# Patient Record
Sex: FEMALE | Race: BLACK OR AFRICAN AMERICAN | NOT HISPANIC OR LATINO | Employment: FULL TIME | RURAL
[De-identification: names, ages, dates, MRNs, and addresses within clinical notes are randomized per-mention and may not be internally consistent; named-entity substitution may affect disease eponyms.]

---

## 2018-04-19 ENCOUNTER — HISTORICAL (OUTPATIENT)
Dept: ADMINISTRATIVE | Facility: HOSPITAL | Age: 58
End: 2018-04-19

## 2018-04-24 LAB
LAB AP GENERAL CAT - HISTORICAL: NORMAL
LAB AP INTERPRETATION/RESULT - HISTORICAL: NEGATIVE
LAB AP SPECIMEN ADEQUACY - HISTORICAL: NORMAL
LAB AP SPECIMEN SUBMITTED - HISTORICAL: NORMAL

## 2019-04-23 ENCOUNTER — HISTORICAL (OUTPATIENT)
Dept: ADMINISTRATIVE | Facility: HOSPITAL | Age: 59
End: 2019-04-23

## 2021-08-04 ENCOUNTER — OFFICE VISIT (OUTPATIENT)
Dept: OBSTETRICS AND GYNECOLOGY | Facility: CLINIC | Age: 61
End: 2021-08-04
Payer: COMMERCIAL

## 2021-08-04 VITALS
RESPIRATION RATE: 16 BRPM | HEART RATE: 73 BPM | TEMPERATURE: 98 F | HEIGHT: 65 IN | SYSTOLIC BLOOD PRESSURE: 153 MMHG | BODY MASS INDEX: 39.49 KG/M2 | DIASTOLIC BLOOD PRESSURE: 99 MMHG | WEIGHT: 237 LBS

## 2021-08-04 DIAGNOSIS — N95.2 VAGINITIS, ATROPHIC: ICD-10-CM

## 2021-08-04 DIAGNOSIS — I10 HYPERTENSION, UNSPECIFIED TYPE: ICD-10-CM

## 2021-08-04 DIAGNOSIS — E66.9 OBESITY, UNSPECIFIED CLASSIFICATION, UNSPECIFIED OBESITY TYPE, UNSPECIFIED WHETHER SERIOUS COMORBIDITY PRESENT: ICD-10-CM

## 2021-08-04 DIAGNOSIS — Z01.419 ENCOUNTER FOR ANNUAL ROUTINE GYNECOLOGICAL EXAMINATION: Primary | ICD-10-CM

## 2021-08-04 DIAGNOSIS — E55.9 VITAMIN D DEFICIENCY: ICD-10-CM

## 2021-08-04 LAB
25(OH)D3 SERPL-MCNC: 35 NG/ML
ALBUMIN SERPL BCP-MCNC: 4 G/DL (ref 3.5–5)
ALBUMIN/GLOB SERPL: 1 {RATIO}
ALP SERPL-CCNC: 56 U/L (ref 50–130)
ALT SERPL W P-5'-P-CCNC: 43 U/L (ref 13–56)
ANION GAP SERPL CALCULATED.3IONS-SCNC: 7 MMOL/L (ref 7–16)
AST SERPL W P-5'-P-CCNC: 33 U/L (ref 15–37)
BASOPHILS # BLD AUTO: 0.03 K/UL (ref 0–0.2)
BASOPHILS NFR BLD AUTO: 0.4 % (ref 0–1)
BILIRUB SERPL-MCNC: 0.2 MG/DL (ref 0–1.2)
BILIRUB UR QL STRIP: NEGATIVE
BUN SERPL-MCNC: 12 MG/DL (ref 7–18)
BUN/CREAT SERPL: 18 (ref 6–20)
CALCIUM SERPL-MCNC: 9.3 MG/DL (ref 8.5–10.1)
CHLORIDE SERPL-SCNC: 104 MMOL/L (ref 98–107)
CHOLEST SERPL-MCNC: 204 MG/DL (ref 0–200)
CHOLEST/HDLC SERPL: 3.8 {RATIO}
CLARITY UR: CLEAR
CO2 SERPL-SCNC: 32 MMOL/L (ref 21–32)
COLOR UR: YELLOW
CREAT SERPL-MCNC: 0.68 MG/DL (ref 0.55–1.02)
DIFFERENTIAL METHOD BLD: ABNORMAL
EOSINOPHIL # BLD AUTO: 0.21 K/UL (ref 0–0.5)
EOSINOPHIL NFR BLD AUTO: 2.5 % (ref 1–4)
ERYTHROCYTE [DISTWIDTH] IN BLOOD BY AUTOMATED COUNT: 16.1 % (ref 11.5–14.5)
EST. AVERAGE GLUCOSE BLD GHB EST-MCNC: 154 MG/DL
GLOBULIN SER-MCNC: 4.2 G/DL (ref 2–4)
GLUCOSE SERPL-MCNC: 72 MG/DL (ref 74–106)
GLUCOSE UR STRIP-MCNC: NEGATIVE MG/DL
HBA1C MFR BLD HPLC: 7.2 % (ref 4.5–6.6)
HCT VFR BLD AUTO: 37.5 % (ref 38–47)
HDLC SERPL-MCNC: 54 MG/DL (ref 40–60)
HGB BLD-MCNC: 11.3 G/DL (ref 12–16)
IMM GRANULOCYTES # BLD AUTO: 0.06 K/UL (ref 0–0.04)
IMM GRANULOCYTES NFR BLD: 0.7 % (ref 0–0.4)
KETONES UR STRIP-SCNC: NEGATIVE MG/DL
LDLC SERPL CALC-MCNC: 123 MG/DL
LDLC/HDLC SERPL: 2.3 {RATIO}
LEUKOCYTE ESTERASE UR QL STRIP: NEGATIVE
LYMPHOCYTES # BLD AUTO: 2.47 K/UL (ref 1–4.8)
LYMPHOCYTES NFR BLD AUTO: 29.2 % (ref 27–41)
MCH RBC QN AUTO: 23.6 PG (ref 27–31)
MCHC RBC AUTO-ENTMCNC: 30.1 G/DL (ref 32–36)
MCV RBC AUTO: 78.5 FL (ref 80–96)
MONOCYTES # BLD AUTO: 0.61 K/UL (ref 0–0.8)
MONOCYTES NFR BLD AUTO: 7.2 % (ref 2–6)
MPC BLD CALC-MCNC: 9.6 FL (ref 9.4–12.4)
NEUTROPHILS # BLD AUTO: 5.08 K/UL (ref 1.8–7.7)
NEUTROPHILS NFR BLD AUTO: 60 % (ref 53–65)
NITRITE UR QL STRIP: NEGATIVE
NONHDLC SERPL-MCNC: 150 MG/DL
NRBC # BLD AUTO: 0 X10E3/UL
NRBC, AUTO (.00): 0 %
PH UR STRIP: 6.5 PH UNITS
PLATELET # BLD AUTO: 333 K/UL (ref 150–400)
POTASSIUM SERPL-SCNC: 3.5 MMOL/L (ref 3.5–5.1)
PROT SERPL-MCNC: 8.2 G/DL (ref 6.4–8.2)
PROT UR QL STRIP: NEGATIVE
RBC # BLD AUTO: 4.78 M/UL (ref 4.2–5.4)
RBC # UR STRIP: NEGATIVE /UL
SODIUM SERPL-SCNC: 139 MMOL/L (ref 136–145)
SP GR UR STRIP: <=1.005
TRIGL SERPL-MCNC: 133 MG/DL (ref 35–150)
UROBILINOGEN UR STRIP-ACNC: 0.2 MG/DL
VLDLC SERPL-MCNC: 27 MG/DL
WBC # BLD AUTO: 8.46 K/UL (ref 4.5–11)

## 2021-08-04 PROCEDURE — 85025 COMPLETE CBC W/AUTO DIFF WBC: CPT | Mod: ,,, | Performed by: CLINICAL MEDICAL LABORATORY

## 2021-08-04 PROCEDURE — 99396 PR PREVENTIVE VISIT,EST,40-64: ICD-10-PCS | Mod: ,,, | Performed by: OBSTETRICS & GYNECOLOGY

## 2021-08-04 PROCEDURE — 81003 URINALYSIS AUTO W/O SCOPE: CPT | Mod: QW,,, | Performed by: CLINICAL MEDICAL LABORATORY

## 2021-08-04 PROCEDURE — 83036 HEMOGLOBIN GLYCOSYLATED A1C: CPT | Mod: ,,, | Performed by: CLINICAL MEDICAL LABORATORY

## 2021-08-04 PROCEDURE — 80053 COMPREHENSIVE METABOLIC PANEL: ICD-10-PCS | Mod: ,,, | Performed by: CLINICAL MEDICAL LABORATORY

## 2021-08-04 PROCEDURE — 99396 PREV VISIT EST AGE 40-64: CPT | Mod: ,,, | Performed by: OBSTETRICS & GYNECOLOGY

## 2021-08-04 PROCEDURE — 36415 PR COLLECTION VENOUS BLOOD,VENIPUNCTURE: ICD-10-PCS | Mod: ,,, | Performed by: OBSTETRICS & GYNECOLOGY

## 2021-08-04 PROCEDURE — 36415 COLL VENOUS BLD VENIPUNCTURE: CPT | Mod: ,,, | Performed by: OBSTETRICS & GYNECOLOGY

## 2021-08-04 PROCEDURE — 80061 LIPID PANEL: ICD-10-PCS | Mod: ,,, | Performed by: CLINICAL MEDICAL LABORATORY

## 2021-08-04 PROCEDURE — 83036 HEMOGLOBIN A1C: ICD-10-PCS | Mod: ,,, | Performed by: CLINICAL MEDICAL LABORATORY

## 2021-08-04 PROCEDURE — 85025 CBC WITH DIFFERENTIAL: ICD-10-PCS | Mod: ,,, | Performed by: CLINICAL MEDICAL LABORATORY

## 2021-08-04 PROCEDURE — 80053 COMPREHEN METABOLIC PANEL: CPT | Mod: ,,, | Performed by: CLINICAL MEDICAL LABORATORY

## 2021-08-04 PROCEDURE — 80061 LIPID PANEL: CPT | Mod: ,,, | Performed by: CLINICAL MEDICAL LABORATORY

## 2021-08-04 PROCEDURE — 82306 VITAMIN D 25 HYDROXY: CPT | Mod: ,,, | Performed by: CLINICAL MEDICAL LABORATORY

## 2021-08-04 PROCEDURE — 81003 URINALYSIS: ICD-10-PCS | Mod: QW,,, | Performed by: CLINICAL MEDICAL LABORATORY

## 2021-08-04 PROCEDURE — 82306 VITAMIN D: ICD-10-PCS | Mod: ,,, | Performed by: CLINICAL MEDICAL LABORATORY

## 2021-08-04 PROCEDURE — 82270 PR BLOOD OCCULT, BY PEROX, FECES, SINGLE, COLORECT SCRN: ICD-10-PCS | Mod: ,,, | Performed by: OBSTETRICS & GYNECOLOGY

## 2021-08-04 PROCEDURE — 88142 CYTOPATH C/V THIN LAYER: CPT | Mod: GCY | Performed by: OBSTETRICS & GYNECOLOGY

## 2021-08-04 PROCEDURE — 82270 OCCULT BLOOD FECES: CPT | Mod: ,,, | Performed by: OBSTETRICS & GYNECOLOGY

## 2021-08-04 RX ORDER — LOSARTAN POTASSIUM AND HYDROCHLOROTHIAZIDE 12.5; 5 MG/1; MG/1
1 TABLET ORAL DAILY
COMMUNITY
Start: 2021-07-02

## 2021-08-04 RX ORDER — ASPIRIN 81 MG/1
81 TABLET ORAL DAILY
COMMUNITY

## 2021-08-04 RX ORDER — METHYLPREDNISOLONE 4 MG/1
TABLET ORAL
COMMUNITY
Start: 2021-06-16 | End: 2021-08-04

## 2021-08-04 RX ORDER — LINAGLIPTIN AND METFORMIN HYDROCHLORIDE 2.5; 5 MG/1; MG/1
1 TABLET, FILM COATED ORAL 2 TIMES DAILY WITH MEALS
COMMUNITY
Start: 2021-06-28

## 2021-08-06 LAB
GH SERPL-MCNC: NORMAL NG/ML
INSULIN SERPL-ACNC: NORMAL U[IU]/ML
LAB AP CLINICAL INFORMATION: NORMAL
LAB AP GYN INTERPRETATION: NEGATIVE
LAB AP PAP DISCLAIMER COMMENTS: NORMAL
RENIN PLAS-CCNC: NORMAL NG/ML/H

## 2023-07-06 ENCOUNTER — OFFICE VISIT (OUTPATIENT)
Dept: OBSTETRICS AND GYNECOLOGY | Facility: CLINIC | Age: 63
End: 2023-07-06
Payer: COMMERCIAL

## 2023-07-06 VITALS
HEART RATE: 94 BPM | RESPIRATION RATE: 16 BRPM | BODY MASS INDEX: 39.22 KG/M2 | SYSTOLIC BLOOD PRESSURE: 144 MMHG | TEMPERATURE: 98 F | WEIGHT: 235.38 LBS | DIASTOLIC BLOOD PRESSURE: 88 MMHG | HEIGHT: 65 IN

## 2023-07-06 DIAGNOSIS — E11.9 DIABETES MELLITUS TYPE II, NON INSULIN DEPENDENT: ICD-10-CM

## 2023-07-06 DIAGNOSIS — L73.9 FOLLICULITIS: Primary | ICD-10-CM

## 2023-07-06 DIAGNOSIS — E55.9 VITAMIN D DEFICIENCY: ICD-10-CM

## 2023-07-06 DIAGNOSIS — E66.9 OBESITY WITH BODY MASS INDEX OF 30.0-39.9: ICD-10-CM

## 2023-07-06 DIAGNOSIS — N95.2 VAGINITIS, ATROPHIC: ICD-10-CM

## 2023-07-06 DIAGNOSIS — Z01.419 ENCOUNTER FOR ANNUAL ROUTINE GYNECOLOGICAL EXAMINATION: ICD-10-CM

## 2023-07-06 DIAGNOSIS — I10 HYPERTENSION, UNSPECIFIED TYPE: ICD-10-CM

## 2023-07-06 DIAGNOSIS — R87.610 ATYPICAL SQUAMOUS CELLS OF UNDETERMINED SIGNIFICANCE (ASCUS) ON PAPANICOLAOU SMEAR OF CERVIX: ICD-10-CM

## 2023-07-06 LAB
BILIRUB UR QL STRIP: NEGATIVE
CLARITY UR: CLEAR
COLOR UR: COLORLESS
GLUCOSE UR STRIP-MCNC: NORMAL MG/DL
KETONES UR STRIP-SCNC: NEGATIVE MG/DL
LEUKOCYTE ESTERASE UR QL STRIP: NEGATIVE
MUCOUS, UA: ABNORMAL /LPF
NITRITE UR QL STRIP: NEGATIVE
PH UR STRIP: 6.5 PH UNITS
PROT UR QL STRIP: NEGATIVE
RBC # UR STRIP: NEGATIVE /UL
SP GR UR STRIP: 1.01
SQUAMOUS #/AREA URNS LPF: ABNORMAL /HPF
UROBILINOGEN UR STRIP-ACNC: NORMAL MG/DL
WBC #/AREA URNS HPF: 1 /HPF

## 2023-07-06 PROCEDURE — 87624 HUMAN PAPILLOMAVIRUS (HPV): ICD-10-PCS | Mod: ,,, | Performed by: CLINICAL MEDICAL LABORATORY

## 2023-07-06 PROCEDURE — 99396 PR PREVENTIVE VISIT,EST,40-64: ICD-10-PCS | Mod: S$PBB,,, | Performed by: OBSTETRICS & GYNECOLOGY

## 2023-07-06 PROCEDURE — 88142 CYTOPATH C/V THIN LAYER: CPT | Mod: TC,GCY | Performed by: OBSTETRICS & GYNECOLOGY

## 2023-07-06 PROCEDURE — 81001 URINALYSIS AUTO W/SCOPE: CPT | Mod: ,,, | Performed by: CLINICAL MEDICAL LABORATORY

## 2023-07-06 PROCEDURE — 3079F PR MOST RECENT DIASTOLIC BLOOD PRESSURE 80-89 MM HG: ICD-10-PCS | Mod: ,,, | Performed by: OBSTETRICS & GYNECOLOGY

## 2023-07-06 PROCEDURE — 3008F BODY MASS INDEX DOCD: CPT | Mod: ,,, | Performed by: OBSTETRICS & GYNECOLOGY

## 2023-07-06 PROCEDURE — 1160F PR REVIEW ALL MEDS BY PRESCRIBER/CLIN PHARMACIST DOCUMENTED: ICD-10-PCS | Mod: ,,, | Performed by: OBSTETRICS & GYNECOLOGY

## 2023-07-06 PROCEDURE — 99214 OFFICE O/P EST MOD 30 MIN: CPT | Mod: PBBFAC | Performed by: OBSTETRICS & GYNECOLOGY

## 2023-07-06 PROCEDURE — 99396 PREV VISIT EST AGE 40-64: CPT | Mod: S$PBB,,, | Performed by: OBSTETRICS & GYNECOLOGY

## 2023-07-06 PROCEDURE — 88141 THINPREP PAP TEST: ICD-10-PCS | Mod: ,,, | Performed by: PATHOLOGY

## 2023-07-06 PROCEDURE — 3079F DIAST BP 80-89 MM HG: CPT | Mod: ,,, | Performed by: OBSTETRICS & GYNECOLOGY

## 2023-07-06 PROCEDURE — 81001 URINALYSIS: ICD-10-PCS | Mod: ,,, | Performed by: CLINICAL MEDICAL LABORATORY

## 2023-07-06 PROCEDURE — 3008F PR BODY MASS INDEX (BMI) DOCUMENTED: ICD-10-PCS | Mod: ,,, | Performed by: OBSTETRICS & GYNECOLOGY

## 2023-07-06 PROCEDURE — 87624 HPV HI-RISK TYP POOLED RSLT: CPT | Mod: ,,, | Performed by: CLINICAL MEDICAL LABORATORY

## 2023-07-06 PROCEDURE — 1159F MED LIST DOCD IN RCRD: CPT | Mod: ,,, | Performed by: OBSTETRICS & GYNECOLOGY

## 2023-07-06 PROCEDURE — 88141 CYTOPATH C/V INTERPRET: CPT | Mod: ,,, | Performed by: PATHOLOGY

## 2023-07-06 PROCEDURE — 1159F PR MEDICATION LIST DOCUMENTED IN MEDICAL RECORD: ICD-10-PCS | Mod: ,,, | Performed by: OBSTETRICS & GYNECOLOGY

## 2023-07-06 PROCEDURE — 3077F SYST BP >= 140 MM HG: CPT | Mod: ,,, | Performed by: OBSTETRICS & GYNECOLOGY

## 2023-07-06 PROCEDURE — 82270 OCCULT BLOOD FECES: CPT | Mod: PBBFAC | Performed by: OBSTETRICS & GYNECOLOGY

## 2023-07-06 PROCEDURE — 3077F PR MOST RECENT SYSTOLIC BLOOD PRESSURE >= 140 MM HG: ICD-10-PCS | Mod: ,,, | Performed by: OBSTETRICS & GYNECOLOGY

## 2023-07-06 PROCEDURE — 1160F RVW MEDS BY RX/DR IN RCRD: CPT | Mod: ,,, | Performed by: OBSTETRICS & GYNECOLOGY

## 2023-07-06 RX ORDER — SITAGLIPTIN AND METFORMIN HYDROCHLORIDE 1000; 100 MG/1; MG/1
TABLET, FILM COATED, EXTENDED RELEASE ORAL
COMMUNITY
Start: 2023-07-03

## 2023-07-06 RX ORDER — HYDROXYCHLOROQUINE SULFATE 200 MG/1
TABLET, FILM COATED ORAL
COMMUNITY
Start: 2023-01-16

## 2023-07-06 NOTE — PROGRESS NOTES
Iza Cortez female  for   Chief Complaint   Patient presents with    Gynecologic Exam     With pap    Bumps on the private area according to the patient for the       OB History          3    Para   3    Term                AB        Living   3         SAB        IAB        Ectopic        Multiple        Live Births                      HPI:  Patient presents today with complaint of least 1 month of tiny bumps on the vulva.  Patient is a  3, para 3. Patient does present for a general gynecologic exam as well.  Patient has had a tubal sterilization in the past.  The patient is post menopause with no history of vaginal bleeding since menopause.  She is on no hormone replacement therapy.  Patient is sexually active without any painful intercourse.    She denies any overactive bladder issues.  She has no urinary stress incontinence.  She has no history of urinary tract infections.  She denies any hematuria, no vaginal bleeding or vaginal discharge and she denies any rectal bleeding.    Past Medical History:   Diagnosis Date    Amenorrhea 2011    Amenorrhea, secondary 2011    Anemia 2006    mild; chronic 10/22/2012-Hgb L 10.4 g/dL 11.0 to 16.0 Hct L 32.5% 2015- No change in [facility]    Anemia 2006    mild; chronic 10/22/2012 - Hgb L 10.4 g/dl; 11.0 to 16.0;  Hct 32.5%;  2015 - no change    BV (bacterial vaginosis) 2011    Constipation 2015    chronic condition    Constipation, unspecified 2015    This is a chronic condition    Diabetes     Diabetes mellitus type II, non insulin dependent     history of     DUB (dysfunctional uterine bleeding) 2006    Dysfunctional uterine bleeding 2006    HTN (hypertension) 2011    Hypertension 2011    Leiomyoma, intramural 2006    cf the report 4 cm fibroid    Leiomyoma, intramural 2006    cf the report  4 cm fibroid    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     Physical exam:    BP (!) 144/88   Pulse 94   Temp 98.4 °F (36.9 °C)   Resp 16   Ht 5' 5" (1.651 m)   Wt 106.8 kg (235 lb 6.4 oz)   BMI 39.17 kg/m²      General Appearance: healthy, alert, no distress, smiling, moderately obese    HEENT: Normal exam    Lymphatic: no palpable lymphadenopathy, no hepatosplenomegaly    Chest:           Breasts:No dimpling, nipple retraction or discharge. No masses or nodes., Normal to inspection, and Normal breast tissue bilaterally           Lungs:clear to auscultation bilaterally, normal percussion bilaterally           Heart: regular rate and rhythm, S1, S2 normal, no murmur, click, rub or gallop    Abdomen: soft, non-tender, without masses or organomegaly, normal bowel sounds, without guarding, without rebound, and moderate abdominal panniculus with no evidence of abdominal bruit or evidence of abnormal fluid collection i.e. no ascites.  There is no ventral or incisional hernia.    Pelvic:                    Vulva: normal appearing vulva with no masses, tenderness or lesions, inspection of the entire vulva reveals no evidence of sebaceous glandular disorder; there is an area just above the labia majora on the left that may have then a folliculitis that is now resolved.  There is no inguinal lymphadenopathy.  There is no signs of vulvar carcinoma or neoplasia.  There is no erythema of the vulva.                     Cervix: normal appearing cervix without discharge " or lesions, multiparous os                    Uterus: uterus is normal size, shape, consistency and nontender, anteverted, mobile                    Annexa(e):  And nontender                   Rectal: normal rectal, no masses, guaiac negative stool obtained.     Extremity: normal, extremities warm, no clubbing, no cyanosis, slight lower extremity edema bilaterally, non-tender; no CVA tenderness bilaterally; negative Homans sign bilaterally    Skin: normal exam    Psych and neurologic exam: WNL                Assessment:   Problem List Items Addressed This Visit          Cardiac/Vascular    Hypertension       Endocrine    Diabetes mellitus type II, non insulin dependent    Relevant Medications    JANUMET -1,000 mg TM24    Obesity with body mass index of 30.0-39.9     Other Visit Diagnoses       Folliculitis    -  Primary    Encounter for annual routine gynecological examination        Vaginitis, atrophic        Vitamin D deficiency                 Plan:  Screening labs; thin prep Pap; negative today; vitamin-D check; recommend keeping her appointment in August of 2023 for a annual mammogram and recommend monthly breast self-exam; recommend interval colonoscopy             Patient was reassured there is no active vulvar disorder such as active folliculitis infection.  Dr. Luan Santos does recommend the patient avoid synthetic undergarments and she was instructed to wear very loose cotton undergarments and avoid during hot weather especially pants or troubles years but wear loose-fitting dresses etc. this will allow for vulvar ventilation.  She was advised to avoid staying in a moist environment such as wet swim suits.  She was advised to use Dial soap for antibacterial persists around the pubic hair.  Patient was reassured.             Patient was advised continue on her vitamin-D therapy     0 Result Notes    1  Topic       Component Ref Range & Units    Case Report   Pap Cytology                                       Case: [id number]                                    Authorizing Provider:  Luan Santos MD         Collected:           07/06/2023 04:37 PM           Ordering Location:     [facility] Received:            07/07/2023 08:40 AM                                  - Obstetrics And                                                                                     Gynecology                                                                    First Screen:          GHULAM Rogel(ASCP)                                                     Rescreen:              GHULAM Lynn(ASCP)                                                         Pathologist:           Minh Jean-Baptiste III, MD                                                                            Specimen:    Liquid-Based Pap Test, Screening, Cervix                                                   Interpretation   Atypical Squamous Cells of Undetermined Significance Abnormal       General Categorization   Epithelial Cell Abnormality   Electronically signed         Addendum on 07/19/2023:  Patient has ASCUS which is a new finding but her HPV is negative.  Patient does need a screening colposcopy.

## 2023-07-06 NOTE — PATIENT INSTRUCTIONS
Dr. Luan Santos thanks you for this office visit at [facility].    Diagnosis for this visit:   Problem List Items Addressed This Visit          Cardiac/Vascular    Hypertension       Endocrine    Diabetes mellitus type II, non insulin dependent    Relevant Medications    JANUMET -1,000 mg TM24    Obesity with body mass index of 30.0-39.9     Other Visit Diagnoses       Folliculitis    -  Primary    Encounter for annual routine gynecological examination        Vaginitis, atrophic        Vitamin D deficiency                 The Plan: Screening labs; thin prep Pap; negative today; vitamin-D check; recommend keeping her appointment in August of 2023 for a annual mammogram and recommend monthly breast self-exam; recommend interval colonoscopy             Patient was reassured there is no active vulvar disorder such as active folliculitis infection.  Dr. Luan Santos does recommend the patient avoid synthetic undergarments and she was instructed to wear very loose cotton undergarments and avoid during hot weather especially pants or troubles years but wear loose-fitting dresses etc. this will allow for vulvar ventilation.  She was advised to avoid staying in a moist environment such as wet swim suits.  She was advised to use Dial soap for antibacterial persists around the pubic hair.  Patient was reassured.             Patient was advised continue on her vitamin-D therapy which is believed        Please practice best food and exercise habits for your age.    Dr. Luan Santos recommends avoidance of smoking and illicit medications or habits.    Please call  or schedule for any change in your health.     Please keep the next scheduled appointment or call for any need to change the appointment.     Dr. Luan Santos recommends yearly exams for good health maintenance.      Thank you    Dr. Luan Santos  07/06/2023 4:29 PM

## 2023-07-10 LAB
GH SERPL-MCNC: ABNORMAL NG/ML
INSULIN SERPL-ACNC: ABNORMAL U[IU]/ML
LAB AP CLINICAL INFORMATION: ABNORMAL
LAB AP GYN INTERPRETATION: ABNORMAL
LAB AP PAP DISCLAIMER COMMENTS: ABNORMAL
RENIN PLAS-CCNC: ABNORMAL NG/ML/H

## 2023-07-14 LAB
HPV 16: NEGATIVE
HPV 18: NEGATIVE
HPV OTHER: NEGATIVE

## 2024-09-24 ENCOUNTER — OFFICE VISIT (OUTPATIENT)
Dept: OBSTETRICS AND GYNECOLOGY | Facility: CLINIC | Age: 64
End: 2024-09-24
Payer: COMMERCIAL

## 2024-09-24 ENCOUNTER — HOSPITAL ENCOUNTER (EMERGENCY)
Facility: HOSPITAL | Age: 64
Discharge: HOME | End: 2024-09-25
Attending: EMERGENCY MEDICINE
Payer: COMMERCIAL

## 2024-09-24 VITALS
SYSTOLIC BLOOD PRESSURE: 130 MMHG | WEIGHT: 225 LBS | DIASTOLIC BLOOD PRESSURE: 75 MMHG | OXYGEN SATURATION: 99 % | HEART RATE: 110 BPM | HEIGHT: 65 IN | RESPIRATION RATE: 18 BRPM | BODY MASS INDEX: 37.49 KG/M2

## 2024-09-24 DIAGNOSIS — N25.9 IMPAIRED RENAL FUNCTION DISORDER: ICD-10-CM

## 2024-09-24 DIAGNOSIS — E83.52 HYPERCALCEMIA: ICD-10-CM

## 2024-09-24 DIAGNOSIS — N95.2 VAGINITIS, ATROPHIC: ICD-10-CM

## 2024-09-24 DIAGNOSIS — Z78.0 POST-MENOPAUSAL: ICD-10-CM

## 2024-09-24 DIAGNOSIS — Z01.89 ENCOUNTER FOR BONE DENSITY MEASUREMENT FOR THERAPEUTIC DRUG MONITORING: Primary | ICD-10-CM

## 2024-09-24 DIAGNOSIS — Z01.419 ENCOUNTER FOR ANNUAL ROUTINE GYNECOLOGICAL EXAMINATION: ICD-10-CM

## 2024-09-24 DIAGNOSIS — Z79.899 ENCOUNTER FOR BONE DENSITY MEASUREMENT FOR THERAPEUTIC DRUG MONITORING: Primary | ICD-10-CM

## 2024-09-24 DIAGNOSIS — I10 HYPERTENSION, UNSPECIFIED TYPE: ICD-10-CM

## 2024-09-24 DIAGNOSIS — E66.9 OBESITY WITH BODY MASS INDEX OF 30.0-39.9: ICD-10-CM

## 2024-09-24 DIAGNOSIS — R73.9 HYPERGLYCEMIA: Primary | ICD-10-CM

## 2024-09-24 DIAGNOSIS — E11.65 HYPERGLYCEMIA DUE TO DIABETES MELLITUS: ICD-10-CM

## 2024-09-24 DIAGNOSIS — K59.00 CONSTIPATION, UNSPECIFIED CONSTIPATION TYPE: ICD-10-CM

## 2024-09-24 DIAGNOSIS — E11.9 DIABETES MELLITUS TYPE II, NON INSULIN DEPENDENT: ICD-10-CM

## 2024-09-24 LAB
ALBUMIN SERPL BCP-MCNC: 3.7 G/DL (ref 3.5–5)
ALBUMIN/GLOB SERPL: 1 {RATIO}
ALP SERPL-CCNC: 77 U/L (ref 50–130)
ALT SERPL W P-5'-P-CCNC: 46 U/L (ref 13–56)
ANION GAP SERPL CALCULATED.3IONS-SCNC: 9 MMOL/L (ref 7–16)
AST SERPL W P-5'-P-CCNC: 33 U/L (ref 15–37)
BASOPHILS # BLD AUTO: 0.03 K/UL (ref 0–0.2)
BASOPHILS NFR BLD AUTO: 0.4 % (ref 0–1)
BILIRUB SERPL-MCNC: 0.2 MG/DL (ref ?–1.2)
BILIRUB UR QL STRIP: NEGATIVE
BILIRUB UR QL STRIP: NEGATIVE
BUN SERPL-MCNC: 18 MG/DL (ref 7–18)
BUN/CREAT SERPL: 14 (ref 6–20)
CALCIUM SERPL-MCNC: 9.4 MG/DL (ref 8.5–10.1)
CHLORIDE SERPL-SCNC: 99 MMOL/L (ref 98–107)
CLARITY UR: CLEAR
CLARITY UR: CLEAR
CO2 SERPL-SCNC: 27 MMOL/L (ref 21–32)
COLOR UR: COLORLESS
COLOR UR: COLORLESS
CREAT SERPL-MCNC: 1.27 MG/DL (ref 0.55–1.02)
DIFFERENTIAL METHOD BLD: ABNORMAL
EGFR (NO RACE VARIABLE) (RUSH/TITUS): 47 ML/MIN/1.73M2
EOSINOPHIL # BLD AUTO: 0.19 K/UL (ref 0–0.5)
EOSINOPHIL NFR BLD AUTO: 2.7 % (ref 1–4)
ERYTHROCYTE [DISTWIDTH] IN BLOOD BY AUTOMATED COUNT: 15.5 % (ref 11.5–14.5)
GLOBULIN SER-MCNC: 3.8 G/DL (ref 2–4)
GLUCOSE SERPL-MCNC: 407 MG/DL (ref 70–105)
GLUCOSE SERPL-MCNC: 606 MG/DL (ref 74–106)
GLUCOSE UR STRIP-MCNC: >1000 MG/DL
GLUCOSE UR STRIP-MCNC: ABNORMAL MG/DL
HCO3 UR-SCNC: 29.7 MMOL/L (ref 24–28)
HCT VFR BLD AUTO: 36 % (ref 38–47)
HCT VFR BLD CALC: 41 % (ref 35–51)
HGB BLD-MCNC: 11 G/DL (ref 12–16)
IMM GRANULOCYTES # BLD AUTO: 0.04 K/UL (ref 0–0.04)
IMM GRANULOCYTES NFR BLD: 0.6 % (ref 0–0.4)
KETONES UR STRIP-SCNC: ABNORMAL MG/DL
KETONES UR STRIP-SCNC: NEGATIVE MG/DL
LDH SERPL L TO P-CCNC: 1.8 MMOL/L (ref 0.3–1.2)
LEUKOCYTE ESTERASE UR QL STRIP: NEGATIVE
LEUKOCYTE ESTERASE UR QL STRIP: NEGATIVE
LYMPHOCYTES # BLD AUTO: 2.32 K/UL (ref 1–4.8)
LYMPHOCYTES NFR BLD AUTO: 32.6 % (ref 27–41)
MCH RBC QN AUTO: 23.6 PG (ref 27–31)
MCHC RBC AUTO-ENTMCNC: 30.6 G/DL (ref 32–36)
MCV RBC AUTO: 77.1 FL (ref 80–96)
MONOCYTES # BLD AUTO: 0.53 K/UL (ref 0–0.8)
MONOCYTES NFR BLD AUTO: 7.5 % (ref 2–6)
MPC BLD CALC-MCNC: 9.9 FL (ref 9.4–12.4)
NEUTROPHILS # BLD AUTO: 4 K/UL (ref 1.8–7.7)
NEUTROPHILS NFR BLD AUTO: 56.2 % (ref 53–65)
NITRITE UR QL STRIP: NEGATIVE
NITRITE UR QL STRIP: NEGATIVE
NRBC # BLD AUTO: 0 X10E3/UL
NRBC, AUTO (.00): 0 %
PCO2 BLDA: 48 MMHG (ref 41–51)
PH SMN: 7.4 [PH] (ref 7.32–7.42)
PH UR STRIP: 5.5 PH UNITS
PH UR STRIP: 5.5 PH UNITS
PLATELET # BLD AUTO: 290 K/UL (ref 150–400)
PO2 BLDA: 24 MMHG (ref 25–40)
POC BASE EXCESS: 4 MMOL/L (ref -2–3)
POC CO2: 31.2 MMOL/L
POC IONIZED CALCIUM: 1.17 MMOL/L (ref 1.15–1.35)
POC SATURATED O2: 43 % (ref 40–70)
POCT GLUCOSE: >500 MG/DL (ref 60–95)
POTASSIUM BLD-SCNC: 4.5 MMOL/L (ref 3.4–4.5)
POTASSIUM SERPL-SCNC: 4.4 MMOL/L (ref 3.5–5.1)
PROT SERPL-MCNC: 7.5 G/DL (ref 6.4–8.2)
PROT UR QL STRIP: NEGATIVE
PROT UR QL STRIP: NEGATIVE
RBC # BLD AUTO: 4.67 M/UL (ref 4.2–5.4)
RBC # UR STRIP: NEGATIVE /UL
RBC # UR STRIP: NEGATIVE /UL
RBC #/AREA URNS HPF: <1 /HPF
SODIUM BLD-SCNC: 130 MMOL/L (ref 136–145)
SODIUM SERPL-SCNC: 131 MMOL/L (ref 136–145)
SP GR UR STRIP: 1.02
SP GR UR STRIP: 1.03
SQUAMOUS #/AREA URNS LPF: ABNORMAL /HPF
UROBILINOGEN UR STRIP-ACNC: NORMAL MG/DL
UROBILINOGEN UR STRIP-ACNC: NORMAL MG/DL
WBC # BLD AUTO: 7.11 K/UL (ref 4.5–11)
WBC #/AREA URNS HPF: <1 /HPF

## 2024-09-24 PROCEDURE — 88142 CYTOPATH C/V THIN LAYER: CPT | Mod: TC,GCY | Performed by: OBSTETRICS & GYNECOLOGY

## 2024-09-24 PROCEDURE — 83605 ASSAY OF LACTIC ACID: CPT

## 2024-09-24 PROCEDURE — 85014 HEMATOCRIT: CPT

## 2024-09-24 PROCEDURE — 3078F DIAST BP <80 MM HG: CPT | Mod: ,,, | Performed by: OBSTETRICS & GYNECOLOGY

## 2024-09-24 PROCEDURE — 3075F SYST BP GE 130 - 139MM HG: CPT | Mod: ,,, | Performed by: OBSTETRICS & GYNECOLOGY

## 2024-09-24 PROCEDURE — 82803 BLOOD GASES ANY COMBINATION: CPT

## 2024-09-24 PROCEDURE — 82962 GLUCOSE BLOOD TEST: CPT

## 2024-09-24 PROCEDURE — 84295 ASSAY OF SERUM SODIUM: CPT

## 2024-09-24 PROCEDURE — 99284 EMERGENCY DEPT VISIT MOD MDM: CPT | Mod: 25

## 2024-09-24 PROCEDURE — 99396 PREV VISIT EST AGE 40-64: CPT | Mod: S$PBB,,, | Performed by: OBSTETRICS & GYNECOLOGY

## 2024-09-24 PROCEDURE — 81001 URINALYSIS AUTO W/SCOPE: CPT | Mod: ,,, | Performed by: CLINICAL MEDICAL LABORATORY

## 2024-09-24 PROCEDURE — 84132 ASSAY OF SERUM POTASSIUM: CPT

## 2024-09-24 PROCEDURE — 88141 CYTOPATH C/V INTERPRET: CPT | Mod: ,,, | Performed by: PATHOLOGY

## 2024-09-24 PROCEDURE — 99999PBSHW PR PBB SHADOW TECHNICAL ONLY FILED TO HB: Mod: PBBFAC,,,

## 2024-09-24 PROCEDURE — 1160F RVW MEDS BY RX/DR IN RCRD: CPT | Mod: ,,, | Performed by: OBSTETRICS & GYNECOLOGY

## 2024-09-24 PROCEDURE — 85025 COMPLETE CBC W/AUTO DIFF WBC: CPT | Performed by: EMERGENCY MEDICINE

## 2024-09-24 PROCEDURE — 96374 THER/PROPH/DIAG INJ IV PUSH: CPT

## 2024-09-24 PROCEDURE — 82330 ASSAY OF CALCIUM: CPT

## 2024-09-24 PROCEDURE — 96361 HYDRATE IV INFUSION ADD-ON: CPT

## 2024-09-24 PROCEDURE — 63600175 PHARM REV CODE 636 W HCPCS: Performed by: EMERGENCY MEDICINE

## 2024-09-24 PROCEDURE — 82270 OCCULT BLOOD FECES: CPT | Mod: PBBFAC | Performed by: OBSTETRICS & GYNECOLOGY

## 2024-09-24 PROCEDURE — 3008F BODY MASS INDEX DOCD: CPT | Mod: ,,, | Performed by: OBSTETRICS & GYNECOLOGY

## 2024-09-24 PROCEDURE — 80053 COMPREHEN METABOLIC PANEL: CPT | Performed by: EMERGENCY MEDICINE

## 2024-09-24 PROCEDURE — 1159F MED LIST DOCD IN RCRD: CPT | Mod: ,,, | Performed by: OBSTETRICS & GYNECOLOGY

## 2024-09-24 PROCEDURE — 87624 HPV HI-RISK TYP POOLED RSLT: CPT | Mod: ,,, | Performed by: CLINICAL MEDICAL LABORATORY

## 2024-09-24 PROCEDURE — 82947 ASSAY GLUCOSE BLOOD QUANT: CPT

## 2024-09-24 PROCEDURE — 99214 OFFICE O/P EST MOD 30 MIN: CPT | Mod: PBBFAC | Performed by: OBSTETRICS & GYNECOLOGY

## 2024-09-24 PROCEDURE — 99999 PR PBB SHADOW E&M-EST. PATIENT-LVL IV: CPT | Mod: PBBFAC,,, | Performed by: OBSTETRICS & GYNECOLOGY

## 2024-09-24 PROCEDURE — 36415 COLL VENOUS BLD VENIPUNCTURE: CPT | Performed by: EMERGENCY MEDICINE

## 2024-09-24 PROCEDURE — 25000003 PHARM REV CODE 250: Performed by: EMERGENCY MEDICINE

## 2024-09-24 RX ADMIN — SODIUM CHLORIDE 1000 ML: 9 INJECTION, SOLUTION INTRAVENOUS at 10:09

## 2024-09-24 RX ADMIN — HUMAN INSULIN 5 UNITS: 100 INJECTION, SOLUTION SUBCUTANEOUS at 10:09

## 2024-09-24 NOTE — PROGRESS NOTES
Iza Cortez female  for   Chief Complaint   Patient presents with    Annual Exam     Patient is here for her annual exam as per patient she is not having any pain or discomfort at this time       OB History          3    Para   3    Term                AB        Living   3         SAB        IAB        Ectopic        Multiple        Live Births                      HPI:  Patient presents at age 63 for a annual gynecologic exam.  The patient is post menopause without any menopausal or postmenopausal vaginal bleeding.  Patient is a  3, para 3 Afro-American female.  She is on no estrogen replacement therapy.  She has no postmenopausal hot flash symptomatology.  She denies mastodynia.  Patient was sexually active without problems.  She denies vaginal dryness.  Patient denies overactive bladder symptomatology or cystitis.  She denies any urinary stress incontinence.  She has no nocturia.    Patient's last mammogram was 2024 at [facility].  She did receive a no indicating that her mammogram was unremarkable.  Patient was had a colonoscopy in [place], [place] was advised to return back in 10 years since she had a normal colonoscopy.    She had in the past has complaints of subcutaneous hidradenitis of the mons pubis but is currently asymptomatic.    Patient does not take over-the-counter vitamin-D but her last vitamin-D 1 year ago was 48.3 ng/mL-excellent.    She does complain of constipation and over-the-counter medications including high-fiber has not helped with the constipation problems.    Past Medical History:   Diagnosis Date    Amenorrhea 2011    Amenorrhea, secondary 2011    Anemia 2006    mild; chronic 10/22/2012-Hgb L 10.4 g/dL 11.0 to 16.0 Hct L 32.5% 2015- No change in [facility]    Anemia 2006    mild; chronic 10/22/2012 - Hgb L 10.4 g/dl; 11.0 to 16.0;  Hct 32.5%;  2015 - no change    BV (bacterial vaginosis) 2011  "   Constipation 09/23/2015    chronic condition    Constipation, unspecified 09/23/2015    This is a chronic condition    Diabetes     Diabetes mellitus type II, non insulin dependent     history of     DUB (dysfunctional uterine bleeding) 03/2006    Dysfunctional uterine bleeding 03/2006    HTN (hypertension) 08/11/2011    Hypertension 08/11/2011    Leiomyoma, intramural 09/05/2006    cf the report 4 cm fibroid    Leiomyoma, intramural 09/05/2006    cf the report  4 cm fibroid    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    Menopausal syndrome 09/19/2011    Natural    Menopausal syndrome 09/19/2011    Morbid obesity 09/27/2011    Obesity, unspecified 08/11/2011    Vaginal discharge 09/27/2011    White with no odor-thin      Past Surgical History:   Procedure Laterality Date    Dilatation Curettage Hysteroscopy Thermal ([place]on) Ablation Procedure  11/08/2006    At [facility]; no complication    TUBAL LIGATION        Review of patient's allergies indicates:  No Known Allergies     Review of Systems:Pertinent items are noted in HPI.     Physical exam:This gyn related exam was chaperoned by a female medical assistant.      /75 (Patient Position: Sitting, BP Method: Large (Automatic))   Pulse 110   Resp 18   Ht 5' 5" (1.651 m)   Wt 102.1 kg (225 lb)   LMP  (LMP Unknown)   SpO2 99%   BMI 37.44 kg/m²      General Appearance: healthy, alert, smiling, patient was BMI is 37.44    HEENT: Normal exam    Lymphatic: no palpable lymphadenopathy, no hepatosplenomegaly    Chest:           Breasts:No dimpling, nipple retraction or discharge. No masses or nodes., Normal to inspection, Normal breast tissue bilaterally, and patient has no evidence of supraclavicular lymphadenopathy bilaterally or any axillary lymphadenopathy bilaterally.           Lungs:clear to auscultation bilaterally           Heart: regular rate and rhythm, S1, S2 normal, no murmur, click, rub or gallop    Abdomen: soft, " non-tender, without masses or organomegaly, protuberant, normal bowel sounds, without guarding, and without rebound    Pelvic:                    Vulva: normal appearing vulva with no masses, tenderness or lesions                    Cervix: normal appearing cervix without discharge or lesions, multiparous os                    Uterus: uterus is normal size, shape, consistency and nontender, anteverted, mobile                    Annexa(e): no masses, adnexae are not palpable on bimanual exam-normal finding                   Rectal: normal rectal, no masses, guaiac negative stool obtained.     Extremity: normal, extremities cool, extremities warm, no clubbing, no cyanosis, no edema; patient has negative CVA tenderness bilaterally; there was good back alignment    Skin: normal exam    Psych and neurologic exam: WNL                Assessment:   Problem List Items Addressed This Visit          Cardiac/Vascular    Hypertension    Relevant Orders    ThinPrep Pap Test    POCT occult blood stool    CBC Auto Differential (Completed)    Comprehensive Metabolic Panel (Completed)    Hemoglobin A1C (Completed)    Lipid Panel (Completed)    Vitamin D (Completed)    Urinalysis, Reflex to Urine Culture (Completed)    Thyroid Panel (Completed)    Urinalysis, Microscopic (Completed)       Endocrine    Diabetes mellitus type II, non insulin dependent    Relevant Orders    ThinPrep Pap Test    POCT occult blood stool    CBC Auto Differential (Completed)    Comprehensive Metabolic Panel (Completed)    Hemoglobin A1C (Completed)    Lipid Panel (Completed)    Vitamin D (Completed)    Urinalysis, Reflex to Urine Culture (Completed)    Thyroid Panel (Completed)    Urinalysis, Microscopic (Completed)    Obesity with body mass index of 30.0-39.9    Relevant Orders    ThinPrep Pap Test    POCT occult blood stool    CBC Auto Differential (Completed)    Comprehensive Metabolic Panel (Completed)    Hemoglobin A1C (Completed)    Lipid Panel  (Completed)    Vitamin D (Completed)    Urinalysis, Reflex to Urine Culture (Completed)    Thyroid Panel (Completed)    Urinalysis, Microscopic (Completed)       GI    Constipation, unspecified    Relevant Orders    ThinPrep Pap Test    POCT occult blood stool    CBC Auto Differential (Completed)    Comprehensive Metabolic Panel (Completed)    Hemoglobin A1C (Completed)    Lipid Panel (Completed)    Vitamin D (Completed)    Urinalysis, Reflex to Urine Culture (Completed)    Thyroid Panel (Completed)    Urinalysis, Microscopic (Completed)     Other Visit Diagnoses       Encounter for bone density measurement for therapeutic drug monitoring    -  Primary    Relevant Orders    DXA Bone Density Axial Skeleton 1 or more sites    Encounter for annual routine gynecological examination        Relevant Orders    ThinPrep Pap Test    POCT occult blood stool    CBC Auto Differential (Completed)    Comprehensive Metabolic Panel (Completed)    Hemoglobin A1C (Completed)    Lipid Panel (Completed)    Vitamin D (Completed)    Urinalysis, Reflex to Urine Culture (Completed)    Thyroid Panel (Completed)    Urinalysis, Microscopic (Completed)    Vaginitis, atrophic        Relevant Orders    ThinPrep Pap Test    POCT occult blood stool    CBC Auto Differential (Completed)    Comprehensive Metabolic Panel (Completed)    Hemoglobin A1C (Completed)    Lipid Panel (Completed)    Vitamin D (Completed)    Urinalysis, Reflex to Urine Culture (Completed)    Thyroid Panel (Completed)    Urinalysis, Microscopic (Completed)    Post-menopausal        Relevant Orders    DXA Bone Density Axial Skeleton 1 or more sites    Hyperglycemia due to diabetes mellitus        Hypercalcemia        10.2 mg/sl    Impaired renal function disorder                 Plan:  Thin prep Pap today; hemoccult screen was negative today; recommend screening CBC, CMP, vitamin-D check and urinalysis.             Dr. Luan Santos recommends monthly breast self-exam; yearly  mammography; doctor Danielle would recommend scheduling for a bone density screening after age 65-schedule in November of this year since she will be 64.         Dr. Santos will start this patient on Linzess since over-the-counter medications that is not helped her constipation.      Addendum at 19:58 hours: glucose elevated at 560 mg/dl! Patient will called and advised to go to the ER for medical evaluation. HgA1C is 12. 4 %; slightly elevated hypercalcemia at 10.2  mg/dl/; glycosuria is also present. She has a decreased renal function - due to DM - probable.

## 2024-09-24 NOTE — PATIENT INSTRUCTIONS
Dr. Luan Santos thanks you for this annual exam visit at [facility]    Please remember to perform monthly breast self exams. If you are over 40 years of age, Dr. Santos recommends yearly mammograms. Please check with your insurance carrier for mammogram insurance coverage.    Colonoscopy indication:      Low risk family history: schedule after age 50 for initial evaluation by a GI specialist.    High risk family history: Schedule before age 50 for initial evaluation by a GI specialist. High risk family history includes history of colon cancer in an offspring, brother or sister or parent.    The Annual Exam or periodic exam may includes thin prep Pap smear and appropriate ancillary labs as allowed by your insurance coverage.The studies Dr. Luan Santos ordered has been checked by our [employer] Electronic Medical Record software today.     Please use perscribed medications as directed.    Special considerations after this visit: Thin prep Pap today; hemoccult screen was negative today; recommend screening CBC, CMP, vitamin-D check and urinalysis.             Dr. Luan Santos recommends monthly breast self-exam; yearly mammography; doctor Danielle would recommend scheduling for a bone density screening after age 65-schedule in November of this year since she will be 64.         Please practice best food and exercise habits for your age.    Dr. Luan Santos recommends avoidance of smoking and illicit medications or poor health habits.    Please call or schedule for any change in your health condition.     Dr. Luan Santos recommends yearly exams for good health maintenance even if you pap smear schedule (as allowed by your health insurance coverage)  does not allow yearly pap testing.    Dr. Luan Santos    09/24/2024 3:25 PM

## 2024-09-25 ENCOUNTER — TELEPHONE (OUTPATIENT)
Dept: OBSTETRICS AND GYNECOLOGY | Facility: CLINIC | Age: 64
End: 2024-09-25
Payer: COMMERCIAL

## 2024-09-25 VITALS
BODY MASS INDEX: 37.49 KG/M2 | HEIGHT: 65 IN | TEMPERATURE: 98 F | DIASTOLIC BLOOD PRESSURE: 85 MMHG | HEART RATE: 79 BPM | RESPIRATION RATE: 18 BRPM | WEIGHT: 225 LBS | OXYGEN SATURATION: 98 % | SYSTOLIC BLOOD PRESSURE: 124 MMHG

## 2024-09-25 LAB — GLUCOSE SERPL-MCNC: 307 MG/DL (ref 70–105)

## 2024-09-25 PROCEDURE — 25000003 PHARM REV CODE 250: Performed by: EMERGENCY MEDICINE

## 2024-09-25 PROCEDURE — 96361 HYDRATE IV INFUSION ADD-ON: CPT

## 2024-09-25 PROCEDURE — 96376 TX/PRO/DX INJ SAME DRUG ADON: CPT

## 2024-09-25 PROCEDURE — 82962 GLUCOSE BLOOD TEST: CPT

## 2024-09-25 PROCEDURE — 63600175 PHARM REV CODE 636 W HCPCS: Performed by: EMERGENCY MEDICINE

## 2024-09-25 RX ADMIN — SODIUM CHLORIDE 1000 ML: 9 INJECTION, SOLUTION INTRAVENOUS at 12:09

## 2024-09-25 RX ADMIN — HUMAN INSULIN 5 UNITS: 100 INJECTION, SOLUTION SUBCUTANEOUS at 12:09

## 2024-09-25 NOTE — ED PROVIDER NOTES
Encounter Date: 9/24/2024    SCRIBE #1 NOTE: I, Halle Paniagua, am scribing for, and in the presence of,  Heron Kumar MD. I have scribed the entire note.       History     Chief Complaint   Patient presents with    Hyperglycemia     Pt presents to ed with c/o having elevated glucose and A1C on her lab work today. States she has glucose in her urine and that her calcium is 10.2     This is a 62 y/o  female,who presents to the ED with complaints of abnormal labs. She states she Dr. Danielle MD called her and explained her BS was 560 and her Calcium is 12. She denies any CP or SOB. She notes she was also told she had sugar in her urine. There are no other complaints/pain in the ED at this time. She has a known hx of HTN and diabetes. There is no smoking hx on file.     The history is provided by the patient. No  was used.     Review of patient's allergies indicates:  No Known Allergies  Past Medical History:   Diagnosis Date    Amenorrhea 09/27/2011    Amenorrhea, secondary 09/27/2011    Anemia 08/2006    mild; chronic 10/22/2012-Hgb L 10.4 g/dL 11.0 to 16.0 Hct L 32.5% 09/22/2015- No change in cdc    Anemia 08/2006    mild; chronic 10/22/2012 - Hgb L 10.4 g/dl; 11.0 to 16.0;  Hct 32.5%;  9/22/2015 - no change    BV (bacterial vaginosis) 09/27/2011    Constipation 09/23/2015    chronic condition    Constipation, unspecified 09/23/2015    This is a chronic condition    Diabetes     Diabetes mellitus type II, non insulin dependent     history of     DUB (dysfunctional uterine bleeding) 03/2006    Dysfunctional uterine bleeding 03/2006    HTN (hypertension) 08/11/2011    Hypertension 08/11/2011    Leiomyoma, intramural 09/05/2006    cf the report 4 cm fibroid    Leiomyoma, intramural 09/05/2006    cf the report  4 cm fibroid    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    Menopausal syndrome 09/19/2011    Natural    Menopausal syndrome 09/19/2011    Morbid obesity  09/27/2011    Obesity, unspecified 08/11/2011    Vaginal discharge 09/27/2011    White with no odor-thin     Past Surgical History:   Procedure Laterality Date    Dilatation Curettage Hysteroscopy Thermal ([place]) Ablation Procedure  11/08/2006    At [facility]; no complication    TUBAL LIGATION       Family History   Problem Relation Name Age of Onset    Hypertension Father      Hypertension Mother      Diabetes Mother       Social History     Tobacco Use    Smoking status: Never    Smokeless tobacco: Never   Substance Use Topics    Alcohol use: Never    Drug use: Never     Review of Systems   Constitutional:         Abnormal labs.      Respiratory:  Negative for shortness of breath.    Cardiovascular:  Negative for chest pain.   All other systems reviewed and are negative.      Physical Exam     Initial Vitals [09/24/24 2115]   BP Pulse Resp Temp SpO2   (!) 156/94 95 16 98 °F (36.7 °C) 95 %      MAP       --         Physical Exam    Nursing note and vitals reviewed.  Constitutional: She appears well-developed and well-nourished.   HENT:   Head: Normocephalic and atraumatic.   Eyes: EOM are normal. Pupils are equal, round, and reactive to light.   Neck: Neck supple. No thyromegaly present.   Normal range of motion.  Cardiovascular:  Normal rate, regular rhythm, normal heart sounds and intact distal pulses.           No murmur heard.  Pulmonary/Chest: Breath sounds normal. She has no wheezes.   Abdominal: Abdomen is soft. Bowel sounds are normal. There is no abdominal tenderness.   Musculoskeletal:         General: No tenderness or edema. Normal range of motion.      Cervical back: Normal range of motion and neck supple.     Lymphadenopathy:     She has no cervical adenopathy.   Neurological: She is alert and oriented to person, place, and time. She has normal strength and normal reflexes. No cranial nerve deficit or sensory deficit. GCS score is 15. GCS eye subscore is 4. GCS verbal subscore is 5. GCS motor  subscore is 6.   Skin: Skin is warm and dry. Capillary refill takes less than 2 seconds. No rash noted.   Psychiatric: She has a normal mood and affect.         ED Course   Procedures  Labs Reviewed   COMPREHENSIVE METABOLIC PANEL - Abnormal       Result Value    Sodium 131 (*)     Potassium 4.4      Chloride 99      CO2 27      Anion Gap 9      Glucose 606 (*)     BUN 18      Creatinine 1.27 (*)     BUN/Creatinine Ratio 14      Calcium 9.4      Total Protein 7.5      Albumin 3.7      Globulin 3.8      A/G Ratio 1.0      Bilirubin, Total 0.2      Alk Phos 77      ALT 46      AST 33      eGFR 47 (*)    CBC WITH DIFFERENTIAL - Abnormal    WBC 7.11      RBC 4.67      Hemoglobin 11.0 (*)     Hematocrit 36.0 (*)     MCV 77.1 (*)     MCH 23.6 (*)     MCHC 30.6 (*)     RDW 15.5 (*)     Platelet Count 290      MPV 9.9      Neutrophils % 56.2      Lymphocytes % 32.6      Monocytes % 7.5 (*)     Eosinophils % 2.7      Basophils % 0.4      Immature Granulocytes % 0.6 (*)     nRBC, Auto 0.0      Neutrophils, Abs 4.00      Lymphocytes, Absolute 2.32      Monocytes, Absolute 0.53      Eosinophils, Absolute 0.19      Basophils, Absolute 0.03      Immature Granulocytes, Absolute 0.04      nRBC, Absolute 0.00      Diff Type Auto     URINALYSIS, REFLEX TO URINE CULTURE - Abnormal    Color, UA Colorless      Clarity, UA Clear      pH, UA 5.5      Leukocytes, UA Negative      Nitrites, UA Negative      Protein, UA Negative      Glucose, UA OVER (*)     Ketones, UA Trace      Urobilinogen, UA Normal      Bilirubin, UA Negative      Blood, UA Negative      Specific Gravity, UA 1.031 (*)    POCT GLUCOSE MONITORING CONTINUOUS - Abnormal    POC Glucose 407 (*)    CBC W/ AUTO DIFFERENTIAL    Narrative:     The following orders were created for panel order CBC auto differential.  Procedure                               Abnormality         Status                     ---------                               -----------         ------                      CBC with Differential[[id number]]       Abnormal            Final result                 Please view results for these tests on the individual orders.   POCT GLUCOSE MONITORING CONTINUOUS          Imaging Results    None          Medications   sodium chloride 0.9% bolus 1,000 mL 1,000 mL (0 mLs Intravenous Stopped 9/24/24 2310)   insulin regular injection 5 Units 0.05 mL (5 Units Intravenous Given 9/24/24 2221)   insulin regular injection 5 Units 0.05 mL (5 Units Intravenous Given 9/25/24 0008)   sodium chloride 0.9% bolus 1,000 mL 1,000 mL (0 mLs Intravenous Stopped 9/25/24 0113)     Medical Decision Making  62 y/o  female,who presents to the ED with complaints of abnormal labs. She states she Dr. Danielle MD called her and explained her BS was 560 and her Calcium is 12    Amount and/or Complexity of Data Reviewed  Labs: ordered. Decision-making details documented in ED Course.  Discussion of management or test interpretation with external provider(s): The patient received 2 L of IV fluid with 10 units of regular insulin in the IV fluid.  Her blood glucose down to 307.  We will discharge her home to follow up with her primary care provider.    Risk  OTC drugs.              Attending Attestation:           Physician Attestation for Scribe:  Physician Attestation Statement for Scribe #1: I, Heron Kumar MD, reviewed documentation, as scribed by Halle Paniagua in my presence, and it is both accurate and complete.                                    Clinical Impression:  Final diagnoses:  [R73.9] Hyperglycemia (Primary)          ED Disposition Condition    Discharge Stable          ED Prescriptions    None       Follow-up Information       Follow up With Specialties Details Why Contact Info    Jamey Marvin MD Family Medicine In 2 days If symptoms worsen [address]  [place] [id number] [phone]               Heron Kumar MD  09/25/24 0123

## 2024-09-25 NOTE — TELEPHONE ENCOUNTER
Received a call from [facility] reach lab stating the patient's Glucose levels were at 560 as of yesterday, labs had been viewed by Dr. Santos and it was unclear whether he had informed her to go to the ER or if the patient decided to go herself, but at the ER her glucose levels were taken once again and they seemed to have gone down but are still elevated for the ranges.

## 2024-09-26 ENCOUNTER — TELEPHONE (OUTPATIENT)
Dept: OBSTETRICS AND GYNECOLOGY | Facility: CLINIC | Age: 64
End: 2024-09-26
Payer: COMMERCIAL

## 2024-09-26 ENCOUNTER — OFFICE VISIT (OUTPATIENT)
Dept: DIABETES SERVICES | Facility: CLINIC | Age: 64
End: 2024-09-26
Payer: COMMERCIAL

## 2024-09-26 VITALS
OXYGEN SATURATION: 98 % | DIASTOLIC BLOOD PRESSURE: 88 MMHG | BODY MASS INDEX: 37.19 KG/M2 | HEIGHT: 65 IN | RESPIRATION RATE: 18 BRPM | WEIGHT: 223.19 LBS | HEART RATE: 100 BPM | SYSTOLIC BLOOD PRESSURE: 132 MMHG

## 2024-09-26 DIAGNOSIS — E11.9 DIABETES MELLITUS TYPE II, NON INSULIN DEPENDENT: Primary | ICD-10-CM

## 2024-09-26 DIAGNOSIS — E66.9 OBESITY WITH BODY MASS INDEX OF 30.0-39.9: ICD-10-CM

## 2024-09-26 DIAGNOSIS — I10 HYPERTENSION, UNSPECIFIED TYPE: ICD-10-CM

## 2024-09-26 LAB
GLUCOSE SERPL-MCNC: 371 MG/DL (ref 70–110)
GLUCOSE SERPL-MCNC: 523 MG/DL (ref 70–105)

## 2024-09-26 PROCEDURE — 3079F DIAST BP 80-89 MM HG: CPT | Mod: CPTII,,, | Performed by: NURSE PRACTITIONER

## 2024-09-26 PROCEDURE — 99214 OFFICE O/P EST MOD 30 MIN: CPT | Mod: S$PBB,,, | Performed by: NURSE PRACTITIONER

## 2024-09-26 PROCEDURE — 1159F MED LIST DOCD IN RCRD: CPT | Mod: CPTII,,, | Performed by: NURSE PRACTITIONER

## 2024-09-26 PROCEDURE — 82962 GLUCOSE BLOOD TEST: CPT | Mod: PBBFAC | Performed by: NURSE PRACTITIONER

## 2024-09-26 PROCEDURE — 3008F BODY MASS INDEX DOCD: CPT | Mod: CPTII,,, | Performed by: NURSE PRACTITIONER

## 2024-09-26 PROCEDURE — 99999 PR PBB SHADOW E&M-EST. PATIENT-LVL V: CPT | Mod: PBBFAC,,, | Performed by: NURSE PRACTITIONER

## 2024-09-26 PROCEDURE — 3046F HEMOGLOBIN A1C LEVEL >9.0%: CPT | Mod: CPTII,,, | Performed by: NURSE PRACTITIONER

## 2024-09-26 PROCEDURE — 1160F RVW MEDS BY RX/DR IN RCRD: CPT | Mod: CPTII,,, | Performed by: NURSE PRACTITIONER

## 2024-09-26 PROCEDURE — 3075F SYST BP GE 130 - 139MM HG: CPT | Mod: CPTII,,, | Performed by: NURSE PRACTITIONER

## 2024-09-26 PROCEDURE — 99999PBSHW POCT GLUCOSE, HAND-HELD DEVICE: Mod: PBBFAC,,,

## 2024-09-26 PROCEDURE — 99215 OFFICE O/P EST HI 40 MIN: CPT | Mod: PBBFAC | Performed by: NURSE PRACTITIONER

## 2024-09-26 RX ORDER — SEMAGLUTIDE 0.68 MG/ML
0.5 INJECTION, SOLUTION SUBCUTANEOUS
Qty: 3 ML | Refills: 2 | Status: SHIPPED | OUTPATIENT
Start: 2024-09-26 | End: 2024-12-25

## 2024-09-26 RX ORDER — INSULIN GLARGINE 300 U/ML
12 INJECTION, SOLUTION SUBCUTANEOUS NIGHTLY
Qty: 1.2 ML | Refills: 11 | Status: SHIPPED | OUTPATIENT
Start: 2024-09-26 | End: 2025-09-26

## 2024-09-26 NOTE — PROGRESS NOTES
Subjective:         Patient ID: Iza Cortez is a 63 y.o. female.  Patient's current PCP is Jamey Marvin MD.     Chief Complaint: Diabetes Mellitus (Patient is here to establish care and glucose check. Patient has been a diabetic for over 10 years. She is checking glucose once daily. Patient c/o issues controlling glucose.)    HPI  Iza Cortez is a 63 y.o. Black or  female presenting for a new consult for diabetes. Patient has been diagnosed with type 2 diabetes for > 10 years.  Received diabetes education:    CURRENT DM MEDICATIONS:   Diabetes Medications               JANUMET -1,000 mg TM24     JENTADUETO 2.5-500 mg Tab Take 1 tablet by mouth 2 (two) times daily with meals.        Not taking the Jentadeueto  Has not been on the Janumet current dose for much longer than a month   Was on janumet previously and had no issues     Past failed treatment include: none     Blood glucose testing is performed regularly. Patient is testing 2 times per day.  Meter:   Preferred lab:    Any episodes of hypoglycemia? no     Complications related to diabetes: peripheral neuropathy and cardiovascular disease    Her blood sugar in the clinic today was:   Lab Results   Component Value Date    POCGLU 371 (A) 09/26/2024       Iza Cortez presents today for follow up visit to discuss diabetes management.   Her A1C currently is 12.4. Her previous A1C in June 13, 2024 was 8.2 with a glucose of 249.   She does not check her glucose daily.   She does not follow a diabetic diet-- good bit of carbs and splurges of fried foods.   She is interested in diabetic education   Discussed lifestyle changes- diet and addition of exercise- walking daily. Discussed medications; her PCP saw her earlier in the week and started her on jardiance 10mg, ozempic 0.25mg, and tresiba 10units nightly. Discussed these medications, discussed target/goal BG range  with fasting AM reading less than 130. Goal right now  "is to be less than 200 consistently.   Discussed CGM- will send in for parmjit 3+ sensor through [facility].     Current diet: does not follow a diabetic diet   Activity Level: sedentary      Lab Results   Component Value Date    HGBA1C 12.4 (H) 09/24/2024    HGBA1C 7.4 (H) 07/06/2023    HGBA1C 7.2 (H) 08/04/2021       STANDARDS OF CARE  Diabetes Management Status    Statin: Not taking  ACE/ARB: Taking    Screening or Prevention Patient's value Goal Complete/Controlled?   HgA1C Testing and Control   Lab Results   Component Value Date    HGBA1C 12.4 (H) 09/24/2024      Annually/Less than 8% No   Lipid profile : 09/24/2024 Annually Yes   LDL control Lab Results   Component Value Date    LDLCALC 76 09/24/2024    Annually/Less than 100 mg/dl  Yes   Nephropathy screening No results found for: "LABMICR"  Lab Results   Component Value Date    PROTEINUA Negative 09/24/2024     No results found for: "UTPCR"   Annually Yes   Blood pressure BP Readings from Last 1 Encounters:   09/26/24 132/88    Less than 140/90 Yes   Dilated retinal exam Most Recent Eye Exam Date: Not Found Annually No   Foot exam   Most Recent Foot Exam Date: Not Found Annually No          Labs reviewed and are noted below.    Lab Results   Component Value Date    WBC 7.11 09/24/2024    HGB 11.0 (L) 09/24/2024    HCT 41 09/24/2024     09/24/2024    CHOL 154 09/24/2024    TRIG 149 09/24/2024    HDL 48 09/24/2024    LDLCALC 76 09/24/2024    ALT 46 09/24/2024    AST 33 09/24/2024     (L) 09/24/2024    K 4.4 09/24/2024    CL 99 09/24/2024    ANIONGAP 9 09/24/2024    CREATININE 1.27 (H) 09/24/2024    ESTGFRAFRICA 113 08/04/2021    BUN 18 09/24/2024    CO2 27 09/24/2024    TSH 1.450 09/24/2024     (HH) 09/24/2024     Lab Results   Component Value Date    FREET4 1.11 09/24/2024    TSH 1.450 09/24/2024    CALCIUM 9.4 09/24/2024     No results found for: "CPEPTIDE"  No results found for: "GLUTAMICACID"  Glucose   Date Value Ref Range Status "   09/24/2024 606 (HH) 74 - 106 mg/dL Final     Anion Gap   Date Value Ref Range Status   09/24/2024 9 7 - 16 mmol/L Final     eGFR    Date Value Ref Range Status   08/04/2021 113 >=60 mL/min/1.73m² Final       The following portions of the patient's history were reviewed and updated as appropriate: allergies, current medications, past family history, past medical history, past social history, past surgical history, and problem list.    Review of patient's allergies indicates:  No Known Allergies  Social History     Socioeconomic History    Marital status:    Tobacco Use    Smoking status: Never    Smokeless tobacco: Never   Substance and Sexual Activity    Alcohol use: Never    Drug use: Never    Sexual activity: Yes     Partners: Male     Past Medical History:   Diagnosis Date    Amenorrhea 09/27/2011    Amenorrhea, secondary 09/27/2011    Anemia 08/2006    mild; chronic 10/22/2012-Hgb L 10.4 g/dL 11.0 to 16.0 Hct L 32.5% 09/22/2015- No change in [facility]    Anemia 08/2006    mild; chronic 10/22/2012 - Hgb L 10.4 g/dl; 11.0 to 16.0;  Hct 32.5%;  9/22/2015 - no change    BV (bacterial vaginosis) 09/27/2011    Constipation 09/23/2015    chronic condition    Constipation, unspecified 09/23/2015    This is a chronic condition    Diabetes     Diabetes mellitus type II, non insulin dependent     history of     DUB (dysfunctional uterine bleeding) 03/2006    Dysfunctional uterine bleeding 03/2006    HTN (hypertension) 08/11/2011    Hypertension 08/11/2011    Leiomyoma, intramural 09/05/2006    cf the report 4 cm fibroid    Leiomyoma, intramural 09/05/2006    cf the report  4 cm fibroid    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  : Denies dysuria.  SKIN: Denies rashes and lesions.  Neuro: No neuropathy.  PSYCH: No tobacco use.  ENDO: See HPI.        Objective:      Vitals:    09/26/24 1336   BP: 132/88   Pulse: 100   Resp: 18     RESPIRATORY: No respiratory distress  NEUROLOGIC: Cranial nerves II-XII grossly intact.   PSYCHIATRIC: Alert & oriented x3. Normal mood and affect.  FOOT EXAMINATION:   Assessment:       1. Diabetes mellitus type II, non insulin dependent    2. Obesity with body mass index of 30.0-39.9    3. Hypertension, unspecified type        Plan:   Diabetes mellitus type II, non insulin dependent  -     POCT Glucose, Hand-Held Device  -     Ambulatory referral/consult to Diabetes Education; Future; Expected date: 10/03/2024  -     semaglutide (OZEMPIC) 0.25 mg or 0.5 mg (2 mg/3 mL) pen injector; Inject 0.5 mg into the skin every 7 days.  Dispense: 3 mL; Refill: 2  -     empagliflozin (JARDIANCE) 10 mg tablet; Take 1 tablet (10 mg total) by mouth once daily.  Dispense: 30 tablet; Refill: 2  -     insulin glargine, TOUJEO, (TOUJEO SOLOSTAR U-300 INSULIN) 300 unit/mL (1.5 mL) InPn pen; Inject 12 Units into the skin every evening.  Dispense: 1.2 mL; Refill: 11  - discussed medication changes and lifestyle modications--diet and exercise   - instructed patient to increase long acting insulin by 2units every 2 nights until she reaches 25units--- call clinic and give readings   - Jacob 3+ sensors sent through [facility]     Obesity with body mass index of 30.0-39.9  - complicates all aspects of care     Hypertension, unspecified type  - BP stable   - continue current meds         - Follow up: 3 months      Portions of this note may have been created with voice recognition software. Occasional "wrong-word" or "sound-a-like" substitutions may have occurred due to the inherent limitations of voice recognition software. Please, read the note " carefully and recognize, using context, where substitutions have occurred.         Diamond KIRKPATRICKP/IDRIS  [facility]

## 2024-09-26 NOTE — TELEPHONE ENCOUNTER
----- Message from Luan Santos MD sent at 9/24/2024  8:08 PM CDT -----  Regarding: severe hyperglycemia  Addendum at 19:58 hours: glucose elevated at 560 mg/dl! Patient will called and advised to go to the ER for medical evaluation. HgA1C is 12. 4 %; slightly elevated hypercalcemia at 10.2  mg/dl/; glycosuria is also present. She has a decreased renal function - due to DM - probable.

## 2024-09-26 NOTE — TELEPHONE ENCOUNTER
Follow up with a phone call verified her name and . Pt states she is fine and she is following up with a diabetic specialist today. No further questions or concerns. Plan of care ongoing.

## 2024-09-26 NOTE — PATIENT INSTRUCTIONS
-- Medications adjusted for today's visit include:  Continue taking the Janumet     Take the ozempic 0.25 for the next 3 weeks   - then you will increase to the 0.5mg weekly     Take the tresiba 10units nightly  -- increase by 2units every other night until your blood sugar is less than 130 consistently     Continue taking the jardiance     -- Limit carbs to no more than 30-45 grams with each meal. Never eat carbs by themselves, always add protein. Make snacks low carb or non-carb only.    -- Continue checking blood sugar 3 times daily: Fasting blood sugar and vary your next 2 readings: Before lunch, before supper, 2 hours after any meal, or bedtime.   -Blood sugar goals should be a fasting blood sugar between , and no blood sugars throughout the day over 180 is good, less than 160 better, less than 140 perfect.    --Carry glucose tablets/soft peppermints/regular juice or Coke product with you at all times to treat a low blood sugar episode (less than 70). If your blood sugar is between 50-70, Chew 4 tablets or drink 1/2 cup of juice or regular Coke product. If your blood sugar is below 50, double the treatment. Re-check blood sugar in 15 minutes. If still low, repeat this. Always call the clinic to give an update for any low blood sugar episodes.    --Exercise as tolerated: Goal 30 minutes/day, 5 days/week. Start slowly and increase as tolerated.    --Follow-up for your next visit in 3 months     --Please either drop off, fax, or MyChart your readings to me as needed.

## 2024-09-28 LAB
HPV 16: NEGATIVE
HPV 18: NEGATIVE
HPV OTHER: NEGATIVE

## 2024-09-30 DIAGNOSIS — R87.610 ASCUS OF CERVIX WITH NEGATIVE HIGH RISK HPV: Primary | ICD-10-CM

## 2024-10-03 ENCOUNTER — TELEPHONE (OUTPATIENT)
Dept: OBSTETRICS AND GYNECOLOGY | Facility: CLINIC | Age: 64
End: 2024-10-03
Payer: COMMERCIAL

## 2024-10-03 NOTE — TELEPHONE ENCOUNTER
Spoke with pt and verified her identity and .Explained to patient about what a coloscopy is and got he schedule for Monday 10/07/24.

## 2024-10-03 NOTE — TELEPHONE ENCOUNTER
----- Message from Luan Santos MD sent at 9/30/2024  8:36 AM CDT -----  Regarding: Normal Pap smears x2 years  Patient has ASCUS for the last 2 years with negative HPV.    Last normal Pap smear was 2021.    She needs colposcopy.

## 2024-10-07 ENCOUNTER — PROCEDURE VISIT (OUTPATIENT)
Dept: OBSTETRICS AND GYNECOLOGY | Facility: CLINIC | Age: 64
End: 2024-10-07
Payer: COMMERCIAL

## 2024-10-07 VITALS
OXYGEN SATURATION: 98 % | SYSTOLIC BLOOD PRESSURE: 104 MMHG | BODY MASS INDEX: 37.12 KG/M2 | RESPIRATION RATE: 18 BRPM | HEART RATE: 84 BPM | HEIGHT: 65 IN | DIASTOLIC BLOOD PRESSURE: 75 MMHG | WEIGHT: 222.81 LBS

## 2024-10-07 DIAGNOSIS — E66.9 OBESITY WITH BODY MASS INDEX OF 30.0-39.9: Primary | ICD-10-CM

## 2024-10-07 DIAGNOSIS — E11.9 DIABETES MELLITUS TYPE II, NON INSULIN DEPENDENT: ICD-10-CM

## 2024-10-07 DIAGNOSIS — R87.610 ASCUS OF CERVIX WITH NEGATIVE HIGH RISK HPV: ICD-10-CM

## 2024-10-07 DIAGNOSIS — E11.65 HYPERGLYCEMIA DUE TO DIABETES MELLITUS: ICD-10-CM

## 2024-10-07 PROCEDURE — 99214 OFFICE O/P EST MOD 30 MIN: CPT | Mod: S$PBB,,, | Performed by: OBSTETRICS & GYNECOLOGY

## 2024-10-07 PROCEDURE — 57452 EXAM OF CERVIX W/SCOPE: CPT | Mod: PBBFAC | Performed by: OBSTETRICS & GYNECOLOGY

## 2024-10-07 NOTE — PROGRESS NOTES
Iza Cortez female  for   Chief Complaint   Patient presents with    Procedure     Patient is here for a COLPO procedure       OB History          3    Para   3    Term                AB        Living   3         SAB        IAB        Ectopic        Multiple        Live Births                      HPI:  Patient presents today for colposcopy due to a history of 2 previous Pap smears revealing ASCUS.  She does have negative HPV.    She was seen recently found have severe hyperglycemia and she was referred for immediate follow-up exam and emergency room visit.  The patient was sent to the emergency room and subsequently she was placed on injectable insulin as well as oral hypoglycemic agent.  She was currently seeing a nurse practitioner for follow-up of her now insulin dependent diabetes.    Patient relates she is now feeling better.  She relates that she is on 12 units daily of the injectable insulin.  Patient was currently is taking insulin glargine-U 300 per mL   She currently is on Ozempic once a week.  Past Medical History:   Diagnosis Date    Amenorrhea 2011    Amenorrhea, secondary 2011    Anemia 2006    mild; chronic 10/22/2012-Hgb L 10.4 g/dL 11.0 to 16.0 Hct L 32.5% 2015- No change in [facility]    Anemia 2006    mild; chronic 10/22/2012 - Hgb L 10.4 g/dl; 11.0 to 16.0;  Hct 32.5%;  2015 - no change    BV (bacterial vaginosis) 2011    Constipation 2015    chronic condition    Constipation, unspecified 2015    This is a chronic condition    Diabetes     Diabetes mellitus type II, non insulin dependent     history of     DUB (dysfunctional uterine bleeding) 2006    Dysfunctional uterine bleeding 2006    HTN (hypertension) 2011    Hypertension 2011    Leiomyoma, intramural 2006    cf the report 4 cm fibroid    Leiomyoma, intramural 2006    cf the report  4 cm fibroid    Malposition of uterus 2006    Second degree  "retroflexed    Menopausal syndrome 09/19/2011    Natural    Menopausal syndrome 09/19/2011    Morbid obesity 09/27/2011    Obesity, unspecified 08/11/2011    Vaginal discharge 09/27/2011    White with no odor-thin      Past Surgical History:   Procedure Laterality Date    Dilatation Curettage Hysteroscopy Thermal ([place]) Ablation Procedure  11/08/2006    At [facility]; no complication    TUBAL LIGATION        Review of patient's allergies indicates:  No Known Allergies     Review of Systems:Pertinent items are noted in HPI.     Physical exam:This gyn related exam was chaperoned by a female medical assistant.      /75 (BP Location: Right arm, Patient Position: Sitting)   Pulse 84   Resp 18   Ht 5' 5" (1.651 m)   Wt 101.1 kg (222 lb 12.8 oz)   LMP  (LMP Unknown)   SpO2 98%   BMI 37.08 kg/m²      General Appearance: healthy, alert, smiling, significantly overweight with a BMI of 37    HEENT: Normal exam    Lymphatic: no palpable lymphadenopathy, no hepatosplenomegaly    Chest:           Breasts:  Not performed           Lungs:clear to auscultation bilaterally           Heart: regular rate and rhythm, S1, S2 normal, no murmur, click, rub or gallop    Abdomen: soft, non-tender, without masses or organomegaly, protuberant, without guarding, without rebound, and very obese    Pelvic:                    Vulva: normal appearing vulva with no masses, tenderness or lesions                    Cervix: normal appearing cervix without discharge or lesions, multiparous os, colposcopic exam fails reveal any aceto-white epithelium with ascetic acid staining                    Uterus:  Hard to feel due to obesity                    Annexa(e):  Not exam                   Rectal: rectal exam not indicated.     Extremity: normal    Skin: normal exam    Psych and neurologic exam: WNL                Assessment:   Problem List Items Addressed This Visit          Endocrine    Diabetes mellitus type II, non insulin dependent "    Obesity with body mass index of 30.0-39.9 - Primary     Other Visit Diagnoses       ASCUS of cervix with negative high risk HPV        Negative colposcopy today    Hyperglycemia due to diabetes mellitus                 Plan:  Colposcopy - colposcopic exam was negative for any dysplastic changes             Random glucose today since she is now both on Ozempic weekly, Janumet twice daily as well as now the insulin glargine 12 units daily.

## 2024-10-07 NOTE — PROCEDURES
Patient Name: Iza Cortez  MRN: [id number]  [facility] - Obstetrics And Gynecology    Date of Surgery:10/07/2024    Operative Report:    Before the procedure: Consent for Office Colposcopy with or without Biopsy    The office based procedure in very minimal. The procedure will examine the vagina and cervix if present. A speculum will be inserted; the vagina and cervix will be inspected and a Hurricane anesthetic spray will induce a minor local anesthesia on the vagina and cervix if present. After inspection, acetic acid stain  (vinegar) solution will be painted on the upper vagina and cervix if present. After inspection a biopsy may be performed if there is an abnormal visual pattern noticed. Any bleeding sites will be stopped with Monsel's solution. The solution acts like an styptic to stop any bleeding. The speculum will be removed and you will placed oin a sitting position.    The risks: Very minimal risk of post procedure infection of the cervix (if present) or vagina. There is some very minimal cramping during and after the procedure. Hurricane spray will be used to reduce this symptoms.    The patient was advised to shower for a few days after the procedure and avoid sexual intercourse. She was advised to call the office for any symptoms including fever, chills, urinary tract symptoms, significant vaginal bleeding or abdomnal /pelvic pain linn tis worsening.    Iza Cortez has voiced understanding and she does consent. Iza Cortez is aware that this conversation is impossible to include all possible items of risk.    This conversion with Dr. Luan Santos and Iza Cortez occurred on 10/07/2024 at 1:33 PM as part of the completion of the pre-operative evaluation in preparation for surgery.      Pre op Diagnosis:  Atypical cells of unknown origin of the cervix (ASCUS x 2 )  Post op Diagnosis:  Postop diagnosis the same with a negative colposcopy    Procedure: Office Colposcopy  without Biopsy of cervix.    Surgeon: Dr Luan Santos    Anesthesia:  No Local [place] Hurricane Anesthesia    Findings:  Slightly scarred cervix with no evidence of aceto-white epithelium    Complication(s): none    Condition:good    Estimated Blood loss:  None    Description:    Iza Cortez was placed into the dorso lithotomy position position for the procedure.    A disposable speculum was placed into the vagina. Colposcopy was initiated.The upper vagina and cervix were visualized.    No Hurricane spray was used to spray the upper vagina and cervix. Acetic acid (vinegar) solution was painted onto the  cervix and upper walls. Several minutes elapsed before inspection by the colposcope.  The visual inspection revealed:  No pathology. Biopsy was not obtained.Monsel solution was not used since there was no biopsy.    The speculum was removed.    Iza Cortez was placed supine and then allowed to sit up. Subsequently she was ambulated.    She tolerated the procedure  - her condition was good    If tissue was removed, specimen(s) sent to pathology.    Luan Santos MD  [facility]  Signed:  10/07/2024 1:33 PM            .

## 2024-10-07 NOTE — PATIENT INSTRUCTIONS
Dr. Luan Santos thanks you for this office procedure visit at [facility].    The diagnosis for this procedure:   Problem List Items Addressed This Visit          Endocrine    Diabetes mellitus type II, non insulin dependent    Obesity with body mass index of 30.0-39.9 - Primary     Other Visit Diagnoses       ASCUS of cervix with negative high risk HPV        Negative colposcopy today    Hyperglycemia due to diabetes mellitus                 The office procedure performed was:  Colposcopy without biopsy.    Dr. Luan Santos recommends the following:    Thank you    Luan Santos MD  10/07/2024 1:36 PM

## 2024-10-10 ENCOUNTER — CLINICAL SUPPORT (OUTPATIENT)
Dept: DIABETES | Facility: CLINIC | Age: 64
End: 2024-10-10
Payer: COMMERCIAL

## 2024-10-10 DIAGNOSIS — E11.9 DIABETES MELLITUS TYPE II, NON INSULIN DEPENDENT: ICD-10-CM

## 2024-10-10 NOTE — LETTER
October 15, 2024      Diamond Vilchis, FNP-AGACNP  [address] [facility] [place] [address]         Patient: Iza Cortez   MR Number: [id number]   YOB: 1960   Date of Visit: 10/10/2024       Dear Diamond Vilchis:    Thank you for referring Iza for diabetes self-management education and support services. She was seen on 10/10/2024 for an initial visit.  Below are goals she set to manage her diabetes better.  My note is in Epic.  She has a follow up visit scheduled with me on 12/5/2024.    Patient Outcomes:    A1c Status:   Lab Results   Component Value Date    HGBA1C 12.4 (H) 09/24/2024    HGBA1C 7.4 (H) 07/06/2023       Care Plan: Diabetes Management        Problem: Healthy Eating         Goal: Eat 3 meals daily with 30-45g/2-3 servings of Carbohydrate per meal.    Start Date: 10/10/2024   Expected End Date: 12/5/2024   Barriers: No Barriers Identified   Note:    Reviewed the sources and role of Carbohydrate, Protein, and Fat and how each nutrient impact blood sugar. Provided meal-planning instruction via food lists, plate method, food models, food labels. Reviewed micro/macronutrient effect on health management. Discussed carbohydrate counting and spacing. Reviewed principles of energy metabolism to assist weight and health management. Discussed strategies for healthier dining out and replacing sugary beverages with water and other noncaloric, sugar free options, healthy snacks and healthy food choices.   (Several handouts provided:  Planning a healthy meal, Building a Balanced Meal, Heart-Healthy Consistent Carbohydrate Nutrition Therapy, Weight loss)         Task: Review the importance of balancing carbohydrates with each meal using portion control techniques to count servings of carbohydrate and label reading to identify serving size and amount of total carbs per serving. Completed 10/10/2024        Task: Provided Sample plate method and reviewed the use of the plate to estimate amounts of carbohydrate per  meal. Completed 10/10/2024        Problem: Medications         Goal: Patient Agrees to take Diabetes Medication(s)  Jardiance, Toujeo, and Ozempic as prescribed and call HCP for any concerns or  meds not working    Start Date: 10/10/2024   Expected End Date: 12/5/2024   Barriers: No Barriers Identified   Note:    Provided review of current diabetes medication action, dosage, frequency, side effects, and storage guidelines. Discussed guidelines for preventing, detecting, and treating hypoglycemia and hyperglycemia. Reviewed the importance of meal and medication timing with diabetes mediations for prevention of acute complications and maximum drug benefit.  Discussed calling HCP if not tolerating medication or if blood sugar is uncontrolled with current regimen. Handout provided on current diabetes medication's (Jardiance, Toujeo, and Ozempic) and safety with medications.     Task: Reviewed with patient all current diabetes medications and provided basic review of the purpose, dosage, frequency, side effects, and storage of both oral and injectable diabetes medications. Completed 10/10/2024          Problem: Chronic Complications         Goal: A1c down by 2 points or more in 3 months  (When next drawn)  Current A1c 12.4% on 9/24/2024)    Start Date: 10/10/2024   Expected End Date: 11/5/2024   Barriers: No Barriers Identified   Note:    Decrease A1c by eating habits, counting carbs, exercising, taking medication as prescribed, calling HCP if parameters are not coming down in between visits, checking blood sugar regularly / randomly.  A handout on Knowing Your Numbers and The A,B,C's of Diabetes. Patient verbalized understanding.      Task: Discussed current A1c, Blood Pressure, and Cholesterol results (ABCs of Diabetes) and reviewed targets of each. Completed 10/10/2024        Task: Discussed importance of annual microalbumin urine test to monitor kidney function. Completed 10/10/2024        Task: Discussed importance  of annual dilated eye exam for prevention of retinopathy. Completed 10/15/2024        Task: Discussed the importance of routine dental exams for the prevention of gum disease and gingivitis and encouraged dental exam every 6 months. Completed 10/10/2024        Task: Instructed on basic daily foot care and encouraged inspecting feet daily. Completed 10/10/2024        Task: Discussed importance of the Flu and Pneumonia Vaccination. Completed 10/10/2024        Task: Scheduled pt for the following health maintenance screenings today: Completed 10/10/2024          Follow up:   Iza to attend medical appointments as scheduled  Iza to update you on her DM education progress as needed      If you have questions, please do not hesitate to call me. I look forward to providing additional education and support as needed.    Sincerely,  Jalyn Green RN  Diabetes Care and

## 2024-10-15 NOTE — PROGRESS NOTES
"Diabetes Care Specialist Progress Note  Author: DERICK TERRELL RN  Date: 10/10/2024    Intake    Program Intake  Reason for Diabetes Program Visit:: Initial Diabetes Assessment (Hx diabetes for > 10 years)  Current diabetes risk level:: moderate (2)  In the last 12 months, have you:: used emergency room services  Was the ER or hospital admission related to diabetes?: Yes  Permission to speak with others about care:: no    Current Diabetes Treatment: DM Injectables, Insulin, Oral Medications  Oral Medication Type/Dose: empagliflozin (JARDIANCE) 10 mg tablet:Take 1 tablet (10 mg total) by mouth once daily.  DM Injectables Type/Dose: semaglutide (OZEMPIC) 0.25 mg or 0.5 mg (2 mg/3 mL) pen injector: Inject 0.5 mg into the skin every 7 days.  Method of insulin delivery?: Injections  Injection Type: Pens  Pen Type/Dose: insulin glargine, TOUJEO, (TOUJEO SOLOSTAR U-300 INSULIN) 300 unit/mL (1.5 mL) InPn pen: Inject 12 Units into the skin every evening. - Subcutaneous    Continuous Glucose Monitoring  Patient has CGM: No    Lab Results   Component Value Date    HGBA1C 12.4 (H) 09/24/2024     64 y/o referred by Diamond Vilchis NP for uncontrolled blood glucose.   Current A1c  is 12.4%  Patient reports it was 8.1% on last visit with PCP in June 2024.       Diabetes Care Specialist Virtual Visit Note       The patient location is: [place]  The chief complaint leading to consultation is: Diabetes  Visit type: audiovisual  Total time spent with patient: 60 min   Each patient to whom he or she provides medical services by telemedicine is:  (1) informed of the relationship between the physician and patient and the respective role of any other health care provider with respect to management of the patient; and (2) notified that he or she may decline to receive medical services by telemedicine and may withdraw from such care at any time.         Vital signs taken from last [facility] Visit on 10/7/2024  Height: 5' 5"  Weight: 222 " lbs  BMI: 37.08    Lifestyle Coping Support & Clinical    Lifestyle/Coping/Support  Compared to other people your age, how would you rate your health?: Good  Does anyone in your family have diabetes or does anyone in your family support you in your diabetes care?:   List anything about Diabetes that causes you stress?: Dealing with every day tasks of checking blood sugar, taking medication  How do you deal with stress/distress?: Walks off  Learning Barriers:: None  Culture or Lutheran beliefs that may impact ability to access healthcare: No  Psychosocial/Coping Skills Assessment Completed: : Yes  Assessment indicates:: Instruction Needed  Area of need?: Yes    Problem Review  Active Comorbidities: Hypertension, Obesity    Diabetes Self-Management Skills Assessment    Medication Skills Assessment  Patient is able to identify current diabetes medications, dosages, and appropriate timing of medications.: yes  Patient reports problems or concerns with current medication regimen.: no  Patient is  aware that some diabetes medications can cause low blood sugar?: Yes  Medication Skills Assessment Completed:: Yes  Assessment indicates:: Knowledge deficit, Instruction Needed  Area of need?: Yes    Diabetes Disease Process/Treatment Options  Diabetes Type?: Type II  When were you diagnosed?: > 10 years  What are your goals for this education session?: Get A1c down  Is patient aware of what causes diabetes?: No  Does patient understand the pathophysiology of diabetes?: No  Diabetes Disease Process/Treatment Options: Skills Assessment Completed: Yes  Assessment indicates:: Knowledge deficit, Instruction Needed  Area of need?: Yes    Nutrition/Healthy Eating  Meal Plan 24 Hour Recall - Breakfast: Boiled egg and a greek yogert water (Cereal: cherrios and raisen bran)  Meal Plan 24 Hour Recall - Lunch: Piece of whole wheat bread with peanut butter water  Meal Plan 24 Hour Recall - Dinner: raw broccoli and carrots with ranch  dressing, salmon  Meal Plan 24 Hour Recall - Snack: nuts, peanut butter, apple  Meal Plan 24 Hour Recall - Beverage: main water, 2% milk  sometimes  almond milk ,  Who shops/cooks?: Self  Nutrition/Healthy Eating Skills Assessment Completed:: Yes  Assessment indicates:: Instruction Needed  Area of need?: Yes    Physical Activity/Exercise  Patient's daily activity level:: moderately active (walks a mile, weights for strenth 4-5 x week)  Physical Activity/Exercise Skills Assessment Completed: : Yes  Assessment indicates:: Instruction Needed  Area of need?: Yes    Home Blood Glucose Monitoring  Patient states that blood sugar is checked at home daily.: yes  Monitoring Method:: home glucometer  Fasting BG range history:: Reports fasting 101 this morning  What are your blood glucose targets?: Not sure  How often do you check your blood sugar?: 2 x day  What is your A1c Target?: Not sure  Home Blood Glucose Monitoring Skills Assessment Completed: : Yes  Assessment indicates:: Instruction Needed  Area of need?: Yes    Acute Complications  Have you ever had hyperglycemia (high  or more)?: yes  How do you treat hyperglycemia? : Went to ER with a bs of 500's  (Had no symptoms) Had a recent sinus infection and was given steroids and it made her blood sugar go up.  Do you ever test for ketones?: no (Discussed buying strips to check urine when blood sugar is elevated.)  Do you have a sick day plan?: no  Acute Complications Skills Assessment Completed: : Yes  Assessment indicates:: Instruction Needed  Area of need?: Yes    Chronic Complications  Reviewed health maintenance: yes  Do you examine your feet daily?: yes  Has your doctor examined your feet?: yes (It's been a few year. Podiatrist is not in the area. To check with PCP about making a referral and if not available ask PCP to check her feet.)  Do you see a Dentist?: yes  Dentist date of last visit:: Goes every 6 mos no problems  Do you see an eye doctor?: yes  Eye  "doctor date of last visit:: apt end of this month goes yearly  Chronic Complications Skills Assessment Completed: : Yes  Assessment indicates:: Instruction Needed  Area of need?: Yes      Assessment Summary and Plan    Based on today's diabetes care assessment, the following areas of need were identified:          10/10/2024   Areas of Need   Medications/Current Diabetes Treatment Yes - See Care Plan     Lifestyle Coping Support Yes - Discussed role of stress on diabetes management. Reviewed stress management techniques and healthy coping strategies. A handout provided on "Diabetes Distress" and "Diabetes and Mental Health" from the ADA.        Diabetes Disease Process/Treatment Options Yes - Reviewed diabetes pathophysiology, different types of diabetes, signs and symptoms, risk factors, progression, and treatment guidelines. Emphasized on the importance of controlling diabetes to prevent complications and how diabetes can be successfully managed. Handout Provided on  Understanding Type 2 Diabetes and how to manage diabetes successfully.      Nutrition/Healthy Eating Yes - See Care Plan     Physical Activity/Exercise Yes  - Discussed importance of daily physical activity with review of benefits, methods, guidelines, and precautions.  A handout on Diabetes and Physical Activity provided.      Home Blood Glucose Monitoring Yes - Reviewed benefits, techniques of self-monitoring blood glucose. Discussed appropriate timing and frequency to SMBG, education on parameters on when to notify provider and advised patient to bring logs to all appts with PCP/Endocrinologist/Diabetes Care Specialist.  Handouts provided on Factors Affecting Blood sugar, Checking Your Blood Sugar, All About Blood Glucose and glucose log to record and bring on next follow up visit.     Acute Complications Yes - Discussed prevention, identification signs/symptoms and treatment of hyperglycemia and hypoglycemia and when to contact clinic. Reviewed " having a sick day Plan. Handout provided on Hypoglycemia, Hyperglycemia, Traveling with Diabetes, and Sick Day Plan.       Chronic Complications Yes - See Care Plan            Today's interventions were provided through individual discussion, instruction, and written materials were provided.      Patient verbalized understanding of instruction and written materials.  Pt was able to return back demonstration of instructions today. Patient understood key points, needs reinforcement and further instruction.     Diabetes Self-Management Care Plan:    Today's Diabetes Self-Management Care Plan was developed with Iza's input. Iza has agreed to work toward the following goal(s) to improve his/her overall diabetes control.      Care Plan: Diabetes Management        Problem: Healthy Eating         Goal: Eat 3 meals daily with 30-45g/2-3 servings of Carbohydrate per meal.    Start Date: 10/10/2024   Expected End Date: 12/5/2024   Barriers: No Barriers Identified   Note:    Reviewed the sources and role of Carbohydrate, Protein, and Fat and how each nutrient impact blood sugar. Provided meal-planning instruction via food lists, plate method, food models, food labels. Reviewed micro/macronutrient effect on health management. Discussed carbohydrate counting and spacing. Reviewed principles of energy metabolism to assist weight and health management. Discussed strategies for healthier dining out and replacing sugary beverages with water and other noncaloric, sugar free options, healthy snacks and healthy food choices.   (Several handouts provided:  Planning a healthy meal, Building a Balanced Meal, Heart-Healthy Consistent Carbohydrate Nutrition Therapy, Weight loss)       Task: Review the importance of balancing carbohydrates with each meal using portion control techniques to count servings of carbohydrate and label reading to identify serving size and amount of total carbs per serving. Completed 10/10/2024        Task:  Provided Sample plate method and reviewed the use of the plate to estimate amounts of carbohydrate per meal. Completed 10/10/2024        Problem: Medications         Goal: Patient Agrees to take Diabetes Medication(s)  Jardiance, Toujeo, and Ozempic as prescribed and call HCP for any concerns or  meds not working    Start Date: 10/10/2024   Expected End Date: 12/5/2024   Barriers: No Barriers Identified   Note:    Provided review of current diabetes medication action, dosage, frequency, side effects, and storage guidelines. Discussed guidelines for preventing, detecting, and treating hypoglycemia and hyperglycemia. Reviewed the importance of meal and medication timing with diabetes mediations for prevention of acute complications and maximum drug benefit.  Discussed calling HCP if not tolerating medication or if blood sugar is uncontrolled with current regimen. Handout provided on current diabetes medication's (Jardiance, Toujeo, and Ozempic) and safety with medications.     Task: Reviewed with patient all current diabetes medications and provided basic review of the purpose, dosage, frequency, side effects, and storage of both oral and injectable diabetes medications. Completed 10/10/2024       Problem: Chronic Complications         Goal: A1c down by 2 points or more in 3 months  (When next drawn)  Current A1c 12.4% on 9/24/2024)    Start Date: 10/10/2024   Expected End Date: 11/5/2024   Barriers: No Barriers Identified   Note:    Decrease A1c by eating habits, counting carbs, exercising, taking medication as prescribed, calling HCP if parameters are not coming down in between visits, checking blood sugar regularly / randomly.  A handout on Knowing Your Numbers and The A,B,C's of Diabetes. Patient verbalized understanding.      Task: Discussed current A1c, Blood Pressure, and Cholesterol results (ABCs of Diabetes) and reviewed targets of each. Completed 10/10/2024        Task: Discussed importance of annual  microalbumin urine test to monitor kidney function. Completed 10/10/2024        Task: Discussed importance of annual dilated eye exam for prevention of retinopathy. Completed 10/10/2024        Task: Discussed the importance of routine dental exams for the prevention of gum disease and gingivitis and encouraged dental exam every 6 months. Completed 10/10/2024        Task: Instructed on basic daily foot care and encouraged inspecting feet daily. Completed 10/10/2024        Task: Discussed importance of the Flu and Pneumonia Vaccination. Completed 10/10/2024        Task: Scheduled pt for the following health maintenance screenings today: Completed 10/10/2024          Follow Up Plan     Follow up in about 8 weeks (around 12/5/2024).    Today's care plan and follow up schedule was discussed with patient.  Iza verbalized understanding of the care plan, goals, and agrees to follow up plan.        The patient was encouraged to communicate with his/her health care provider/physician and care team regarding his/her condition(s) and treatment.  I provided the patient with my contact information today and encouraged to contact me via phone or [facility] as needed.     Length of Visit   Total Time: 60 Minutes

## 2024-12-10 ENCOUNTER — HOSPITAL ENCOUNTER (OUTPATIENT)
Dept: RADIOLOGY | Facility: HOSPITAL | Age: 64
Discharge: HOME | End: 2024-12-10
Attending: OBSTETRICS & GYNECOLOGY
Payer: MEDICARE

## 2024-12-10 ENCOUNTER — OFFICE VISIT (OUTPATIENT)
Dept: DIABETES SERVICES | Facility: CLINIC | Age: 64
End: 2024-12-10
Payer: MEDICARE

## 2024-12-10 VITALS
RESPIRATION RATE: 16 BRPM | HEART RATE: 83 BPM | WEIGHT: 215.63 LBS | SYSTOLIC BLOOD PRESSURE: 120 MMHG | HEIGHT: 65 IN | BODY MASS INDEX: 35.93 KG/M2 | OXYGEN SATURATION: 97 % | DIASTOLIC BLOOD PRESSURE: 82 MMHG

## 2024-12-10 DIAGNOSIS — Z01.89 ENCOUNTER FOR BONE DENSITY MEASUREMENT FOR THERAPEUTIC DRUG MONITORING: ICD-10-CM

## 2024-12-10 DIAGNOSIS — K59.00 CONSTIPATION, UNSPECIFIED CONSTIPATION TYPE: ICD-10-CM

## 2024-12-10 DIAGNOSIS — Z78.0 POST-MENOPAUSAL: ICD-10-CM

## 2024-12-10 DIAGNOSIS — E11.9 DIABETES MELLITUS TYPE II, NON INSULIN DEPENDENT: Primary | ICD-10-CM

## 2024-12-10 DIAGNOSIS — Z79.899 ENCOUNTER FOR BONE DENSITY MEASUREMENT FOR THERAPEUTIC DRUG MONITORING: ICD-10-CM

## 2024-12-10 DIAGNOSIS — I10 HYPERTENSION, UNSPECIFIED TYPE: ICD-10-CM

## 2024-12-10 LAB
GLUCOSE SERPL-MCNC: 117 MG/DL (ref 70–110)
HBA1C MFR BLD: 7.8 % (ref 4.5–6.6)

## 2024-12-10 PROCEDURE — 99214 OFFICE O/P EST MOD 30 MIN: CPT | Mod: PBBFAC,25 | Performed by: NURSE PRACTITIONER

## 2024-12-10 PROCEDURE — 3008F BODY MASS INDEX DOCD: CPT | Mod: CPTII,,, | Performed by: NURSE PRACTITIONER

## 2024-12-10 PROCEDURE — 99999PBSHW POCT HEMOGLOBIN A1C: Mod: PBBFAC,,,

## 2024-12-10 PROCEDURE — 99999PBSHW POCT GLUCOSE, HAND-HELD DEVICE: Mod: PBBFAC,,,

## 2024-12-10 PROCEDURE — 99999 PR PBB SHADOW E&M-EST. PATIENT-LVL IV: CPT | Mod: PBBFAC,,, | Performed by: NURSE PRACTITIONER

## 2024-12-10 PROCEDURE — 3288F FALL RISK ASSESSMENT DOCD: CPT | Mod: CPTII,,, | Performed by: NURSE PRACTITIONER

## 2024-12-10 PROCEDURE — 77080 DXA BONE DENSITY AXIAL: CPT | Mod: TC

## 2024-12-10 PROCEDURE — 99214 OFFICE O/P EST MOD 30 MIN: CPT | Mod: S$PBB,,, | Performed by: NURSE PRACTITIONER

## 2024-12-10 PROCEDURE — 77080 DXA BONE DENSITY AXIAL: CPT | Mod: 26,,, | Performed by: NUCLEAR MEDICINE

## 2024-12-10 PROCEDURE — 1101F PT FALLS ASSESS-DOCD LE1/YR: CPT | Mod: CPTII,,, | Performed by: NURSE PRACTITIONER

## 2024-12-10 PROCEDURE — 3079F DIAST BP 80-89 MM HG: CPT | Mod: CPTII,,, | Performed by: NURSE PRACTITIONER

## 2024-12-10 PROCEDURE — 1159F MED LIST DOCD IN RCRD: CPT | Mod: CPTII,,, | Performed by: NURSE PRACTITIONER

## 2024-12-10 PROCEDURE — 1160F RVW MEDS BY RX/DR IN RCRD: CPT | Mod: CPTII,,, | Performed by: NURSE PRACTITIONER

## 2024-12-10 PROCEDURE — 82962 GLUCOSE BLOOD TEST: CPT | Mod: PBBFAC | Performed by: NURSE PRACTITIONER

## 2024-12-10 PROCEDURE — 83036 HEMOGLOBIN GLYCOSYLATED A1C: CPT | Mod: PBBFAC | Performed by: NURSE PRACTITIONER

## 2024-12-10 PROCEDURE — 3051F HG A1C>EQUAL 7.0%<8.0%: CPT | Mod: CPTII,,, | Performed by: NURSE PRACTITIONER

## 2024-12-10 PROCEDURE — 3074F SYST BP LT 130 MM HG: CPT | Mod: CPTII,,, | Performed by: NURSE PRACTITIONER

## 2024-12-10 RX ORDER — SEMAGLUTIDE 1.34 MG/ML
1 INJECTION, SOLUTION SUBCUTANEOUS
Qty: 3 ML | Refills: 11 | Status: SHIPPED | OUTPATIENT
Start: 2024-12-10 | End: 2025-12-10

## 2024-12-10 RX ORDER — INSULIN GLARGINE 300 [IU]/ML
12 INJECTION, SOLUTION SUBCUTANEOUS NIGHTLY
Qty: 1.2 ML | Refills: 11 | Status: SHIPPED | OUTPATIENT
Start: 2024-12-10 | End: 2025-12-10

## 2024-12-10 RX ORDER — SITAGLIPTIN AND METFORMIN HYDROCHLORIDE 1000; 50 MG/1; MG/1
1 TABLET, FILM COATED, EXTENDED RELEASE ORAL 2 TIMES DAILY
Qty: 90 TABLET | Refills: 3 | Status: SHIPPED | OUTPATIENT
Start: 2024-12-10 | End: 2025-12-10

## 2024-12-10 NOTE — PATIENT INSTRUCTIONS
-- Medications adjusted for today's visit include:  Continue your janumet and jardiance   Continue your TOUJEO at 12units     Increase your ozempic from 0.5mg to 1mg     -- Limit carbs to no more than 30-45 grams with each meal. Never eat carbs by themselves, always add protein. Make snacks low carb or non-carb only.    -- Continue checking blood sugar 3 times daily: Fasting blood sugar and vary your next 2 readings: Before lunch, before supper, 2 hours after any meal, or bedtime.   -Blood sugar goals should be a fasting blood sugar between , and no blood sugars throughout the day over 180 is good, less than 160 better, less than 140 perfect.    --Carry glucose tablets/soft peppermints/regular juice or Coke product with you at all times to treat a low blood sugar episode (less than 70). If your blood sugar is between 50-70, Chew 4 tablets or drink 1/2 cup of juice or regular Coke product. If your blood sugar is below 50, double the treatment. Re-check blood sugar in 15 minutes. If still low, repeat this. Always call the clinic to give an update for any low blood sugar episodes.    --Exercise as tolerated: Goal 30 minutes/day, 5 days/week. Start slowly and increase as tolerated.    --Follow-up for your next visit in 3 months    --Please either drop off, fax, or MyChart your readings to me as needed.

## 2024-12-10 NOTE — PROGRESS NOTES
Subjective:         Patient ID: Iza Cortez is a 64 y.o. female.  Patient's current PCP is Jamey Marvin MD.     Chief Complaint: Diabetes Mellitus (Patient is here for 3 month follow up. Glucose and A1C check. Patient is checking glucose once daily. No patient complaints. )    HPI  Iza Cortez is a 64 y.o. Black or  female presenting for a follow up for diabetes. Patient has been diagnosed with type 2 diabetes for > 10 years.  Received diabetes education: yes     CURRENT DM MEDICATIONS:   Diabetes Medications               empagliflozin (JARDIANCE) 10 mg tablet Take 1 tablet (10 mg total) by mouth once daily.    insulin glargine, TOUJEO, (TOUJEO SOLOSTAR U-300 INSULIN) 300 unit/mL (1.5 mL) InPn pen Inject 12 Units into the skin every evening.    JANUMET -1,000 mg TM24     JENTADUETO 2.5-500 mg Tab Take 1 tablet by mouth 2 (two) times daily with meals.    semaglutide (OZEMPIC) 0.25 mg or 0.5 mg (2 mg/3 mL) pen injector Inject 0.5 mg into the skin every 7 days.        Stopped jantadueto       Past failed treatment include: N/A     Blood glucose testing is performed regularly. Patient is testing 1 times per day. 70-90s in the AM.   Meter:   Preferred lab: [facility]     Any episodes of hypoglycemia? no     Complications related to diabetes: peripheral neuropathy and cardiovascular disease    Her blood sugar in the clinic today was:   Lab Results   Component Value Date    POCGLU 117 (A) 12/10/2024       Iza Cortez presents today for follow up visit to discuss diabetes management. A1c is down to 7.8 from 12.4. She is checking her glucose daily. She has made diet changes and does typically try to follow a diabetic diet with some splurges. She denies any needs or complaints with her medications.   We will increase her ozempic from 0.5mg to 1mg.   Educated on diet and exercise. Educated on goal fasting blood glucose of .     Current diet: attempts to follow a diabetic diet- some  "splurges   Activity Level: walks with some strength training, stretches     Lab Results   Component Value Date    HGBA1C 7.8 (A) 12/10/2024    HGBA1C 12.4 (H) 09/24/2024    HGBA1C 7.4 (H) 07/06/2023       STANDARDS OF CARE  Diabetes Management Status    Statin: Not taking  ACE/ARB: Taking    Screening or Prevention Patient's value Goal Complete/Controlled?   HgA1C Testing and Control   Lab Results   Component Value Date    HGBA1C 7.8 (A) 12/10/2024      Annually/Less than 8% Yes   Lipid profile : 09/24/2024 Annually Yes   LDL control Lab Results   Component Value Date    LDLCALC 76 09/24/2024    Annually/Less than 100 mg/dl  Yes   Nephropathy screening No results found for: "LABMICR"  Lab Results   Component Value Date    PROTEINUA Negative 09/24/2024     No results found for: "UTPCR"   Annually Yes   Blood pressure BP Readings from Last 1 Encounters:   12/10/24 120/82    Less than 140/90 Yes   Dilated retinal exam : 10/30/2024  [place] [facility]--- had earlier in the year  Annually No   Foot exam   : 12/10/2024 Annually No          Labs reviewed and are noted below.    Lab Results   Component Value Date    WBC 7.11 09/24/2024    HGB 11.0 (L) 09/24/2024    HCT 41 09/24/2024     09/24/2024    CHOL 154 09/24/2024    TRIG 149 09/24/2024    HDL 48 09/24/2024    LDLCALC 76 09/24/2024    ALT 46 09/24/2024    AST 33 09/24/2024     (L) 09/24/2024    K 4.4 09/24/2024    CL 99 09/24/2024    ANIONGAP 9 09/24/2024    CREATININE 1.27 (H) 09/24/2024    ESTGFRAFRICA 113 08/04/2021    BUN 18 09/24/2024    CO2 27 09/24/2024    TSH 1.450 09/24/2024     (H) 10/07/2024     Lab Results   Component Value Date    FREET4 1.11 09/24/2024    TSH 1.450 09/24/2024    CALCIUM 9.4 09/24/2024     No results found for: "CPEPTIDE"  No results found for: "GLUTAMICACID"  Glucose   Date Value Ref Range Status   10/07/2024 142 (H) 74 - 106 mg/dL Final     Anion Gap   Date Value Ref Range Status   09/24/2024 9 7 - 16 mmol/L Final "     eGFR    Date Value Ref Range Status   08/04/2021 113 >=60 mL/min/1.73m² Final       The following portions of the patient's history were reviewed and updated as appropriate: allergies, current medications, past family history, past medical history, past social history, past surgical history, and problem list.    Review of patient's allergies indicates:  No Known Allergies  Social History     Socioeconomic History    Marital status:    Tobacco Use    Smoking status: Never    Smokeless tobacco: Never   Substance and Sexual Activity    Alcohol use: Never    Drug use: Never    Sexual activity: Yes     Partners: Male     Past Medical History:   Diagnosis Date    Amenorrhea 09/27/2011    Amenorrhea, secondary 09/27/2011    Anemia 08/2006    mild; chronic 10/22/2012-Hgb L 10.4 g/dL 11.0 to 16.0 Hct L 32.5% 09/22/2015- No change in [facility]    Anemia 08/2006    mild; chronic 10/22/2012 - Hgb L 10.4 g/dl; 11.0 to 16.0;  Hct 32.5%;  9/22/2015 - no change    BV (bacterial vaginosis) 09/27/2011    Constipation 09/23/2015    chronic condition    Constipation, unspecified 09/23/2015    This is a chronic condition    Diabetes     Diabetes mellitus type II, non insulin dependent     history of     DUB (dysfunctional uterine bleeding) 03/2006    Dysfunctional uterine bleeding 03/2006    HTN (hypertension) 08/11/2011    Hypertension 08/11/2011    Leiomyoma, intramural 09/05/2006    cf the report 4 cm fibroid    Leiomyoma, intramural 09/05/2006    cf the report  4 cm fibroid    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  Cardiovascular: History of HTN and HLD   GI: Denies nausea,vomiting,constipation,or diarrhea.  : Denies dysuria.  SKIN: Denies  "rashes and lesions.  Neuro: No neuropathy.  PSYCH: No tobacco use.  ENDO: See HPI.        Objective:      Vitals:    12/10/24 1043   BP: 120/82   Pulse: 83   Resp: 16     RESPIRATORY: No respiratory distress  NEUROLOGIC: Cranial nerves II-XII grossly intact.   PSYCHIATRIC: Alert & oriented x3. Normal mood and affect.  FOOT EXAMINATION: Protective Sensation (w/ 10 gram monofilament):  Right: Intact  Left: Intact    Visual Inspection:  Normal -  Bilateral    Pedal Pulses:   Right: Present  Left: Present    Posterior Tibialis Pulses:   Right:Present  Left: Present    Assessment:       1. Diabetes mellitus type II, non insulin dependent    2. Hypertension, unspecified type    3. Constipation, unspecified constipation type        Plan:   Diabetes mellitus type II, non insulin dependent  -     POCT Glucose, Hand-Held Device  -     Hemoglobin A1C, POCT  -     Microalbumin/Creatinine Ratio, Urine  -     empagliflozin (JARDIANCE) 10 mg tablet; Take 1 tablet (10 mg total) by mouth once daily.  Dispense: 30 tablet; Refill: 2  -     insulin glargine, TOUJEO, (TOUJEO SOLOSTAR U-300 INSULIN) 300 unit/mL (1.5 mL) InPn pen; Inject 12 Units into the skin every evening.  Dispense: 1.2 mL; Refill: 11  -     SITagliptan-metformin (JANUMET XR) 50-1,000 mg TM24; Take 1 tablet by mouth 2 (two) times a day.  Dispense: 90 tablet; Refill: 3  -     semaglutide (OZEMPIC) 1 mg/dose (4 mg/3 mL); Inject 1 mg into the skin every 7 days.  Dispense: 3 mL; Refill: 11  - discussed diet, exercise  - discussed goal fasting glucose      Hypertension, unspecified type  - noted; stable     Constipation, unspecified constipation type  - noted; on linzess         - Follow up: 3 months        Portions of this note may have been created with voice recognition software. Occasional "wrong-word" or "sound-a-like" substitutions may have occurred due to the inherent limitations of voice recognition software. Please, read the note carefully and recognize, " using context, where substitutions have occurred.         Diamond MODI/IDRIS  [facility]

## 2025-03-11 ENCOUNTER — OFFICE VISIT (OUTPATIENT)
Dept: DIABETES SERVICES | Facility: CLINIC | Age: 65
End: 2025-03-11
Payer: MEDICARE

## 2025-03-11 VITALS
HEIGHT: 65 IN | BODY MASS INDEX: 33.99 KG/M2 | OXYGEN SATURATION: 100 % | DIASTOLIC BLOOD PRESSURE: 76 MMHG | RESPIRATION RATE: 17 BRPM | WEIGHT: 204 LBS | HEART RATE: 97 BPM | SYSTOLIC BLOOD PRESSURE: 115 MMHG

## 2025-03-11 DIAGNOSIS — I10 HYPERTENSION, UNSPECIFIED TYPE: ICD-10-CM

## 2025-03-11 DIAGNOSIS — E11.9 DIABETES MELLITUS TYPE II, NON INSULIN DEPENDENT: Primary | ICD-10-CM

## 2025-03-11 LAB
GLUCOSE SERPL-MCNC: 76 MG/DL (ref 70–110)
HBA1C MFR BLD: 5.5 % (ref 4.5–6.6)

## 2025-03-11 PROCEDURE — 99999PBSHW POCT GLUCOSE, HAND-HELD DEVICE: Mod: PBBFAC,,,

## 2025-03-11 PROCEDURE — 83036 HEMOGLOBIN GLYCOSYLATED A1C: CPT | Mod: PBBFAC | Performed by: NURSE PRACTITIONER

## 2025-03-11 PROCEDURE — 1126F AMNT PAIN NOTED NONE PRSNT: CPT | Mod: CPTII,,, | Performed by: NURSE PRACTITIONER

## 2025-03-11 PROCEDURE — 99214 OFFICE O/P EST MOD 30 MIN: CPT | Mod: PBBFAC | Performed by: NURSE PRACTITIONER

## 2025-03-11 PROCEDURE — 1160F RVW MEDS BY RX/DR IN RCRD: CPT | Mod: CPTII,,, | Performed by: NURSE PRACTITIONER

## 2025-03-11 PROCEDURE — 3288F FALL RISK ASSESSMENT DOCD: CPT | Mod: CPTII,,, | Performed by: NURSE PRACTITIONER

## 2025-03-11 PROCEDURE — 3008F BODY MASS INDEX DOCD: CPT | Mod: CPTII,,, | Performed by: NURSE PRACTITIONER

## 2025-03-11 PROCEDURE — 3074F SYST BP LT 130 MM HG: CPT | Mod: CPTII,,, | Performed by: NURSE PRACTITIONER

## 2025-03-11 PROCEDURE — 99214 OFFICE O/P EST MOD 30 MIN: CPT | Mod: S$PBB,,, | Performed by: NURSE PRACTITIONER

## 2025-03-11 PROCEDURE — 1101F PT FALLS ASSESS-DOCD LE1/YR: CPT | Mod: CPTII,,, | Performed by: NURSE PRACTITIONER

## 2025-03-11 PROCEDURE — 99999PBSHW POCT HEMOGLOBIN A1C: Mod: PBBFAC,,,

## 2025-03-11 PROCEDURE — 1159F MED LIST DOCD IN RCRD: CPT | Mod: CPTII,,, | Performed by: NURSE PRACTITIONER

## 2025-03-11 PROCEDURE — 3044F HG A1C LEVEL LT 7.0%: CPT | Mod: CPTII,,, | Performed by: NURSE PRACTITIONER

## 2025-03-11 PROCEDURE — 82962 GLUCOSE BLOOD TEST: CPT | Mod: PBBFAC | Performed by: NURSE PRACTITIONER

## 2025-03-11 PROCEDURE — 99999 PR PBB SHADOW E&M-EST. PATIENT-LVL IV: CPT | Mod: PBBFAC,,, | Performed by: NURSE PRACTITIONER

## 2025-03-11 PROCEDURE — 3078F DIAST BP <80 MM HG: CPT | Mod: CPTII,,, | Performed by: NURSE PRACTITIONER

## 2025-03-11 RX ORDER — SEMAGLUTIDE 1.34 MG/ML
1 INJECTION, SOLUTION SUBCUTANEOUS
Qty: 3 ML | Refills: 11 | Status: SHIPPED | OUTPATIENT
Start: 2025-03-11 | End: 2026-03-11

## 2025-03-11 RX ORDER — AMOXICILLIN 875 MG/1
875 TABLET, FILM COATED ORAL 2 TIMES DAILY
COMMUNITY
Start: 2024-09-16

## 2025-03-11 RX ORDER — SITAGLIPTIN AND METFORMIN HYDROCHLORIDE 1000; 50 MG/1; MG/1
1 TABLET, FILM COATED, EXTENDED RELEASE ORAL 2 TIMES DAILY
Qty: 90 TABLET | Refills: 3 | Status: SHIPPED | OUTPATIENT
Start: 2025-03-11 | End: 2026-03-11

## 2025-03-11 RX ORDER — HYDROCODONE BITARTRATE AND HOMATROPINE METHYLBROMIDE ORAL SOLUTION 5; 1.5 MG/5ML; MG/5ML
LIQUID ORAL
COMMUNITY
Start: 2024-09-16

## 2025-03-11 NOTE — PROGRESS NOTES
Subjective:         Patient ID: Iza Cortez is a 64 y.o. female.  Patient's current PCP is Jamey Marvin MD.     Chief Complaint: Diabetes Mellitus (Patient is here for a 3 month follow up. Patient will receive a blood glucose check and A1C check. She uses a parmjit to check her blood sugar. She states it goes off in the middle of night saying her glucose is too low. She is requesting to stop taking insulin. )    HPI  Iza Cortez is a 64 y.o. Black or  female presenting for a follow up for diabetes. Patient has been diagnosed with type 2 diabetes for several years.  Received diabetes education:    CURRENT DM MEDICATIONS:   Diabetes Medications              empagliflozin (JARDIANCE) 10 mg tablet Take 1 tablet (10 mg total) by mouth once daily.    insulin glargine, TOUJEO, (TOUJEO SOLOSTAR U-300 INSULIN) 300 unit/mL (1.5 mL) InPn pen Inject 12 Units into the skin every evening.    semaglutide (OZEMPIC) 1 mg/dose (4 mg/3 mL) Inject 1 mg into the skin every 7 days.    SITagliptan-metformin (JANUMET XR) 50-1,000 mg TM24 Take 1 tablet by mouth 2 (two) times a day.              Past failed treatment include: N/A     Blood glucose testing is performed regularly. She wears a parmjit.   Meter:   Preferred lab:    Any episodes of hypoglycemia? no     Complications related to diabetes: cardiovascular disease    Her blood sugar in the clinic today was:   Lab Results   Component Value Date    POCGLU 76 03/11/2025       CGM reviewed during visit  She has no very lows, highs, or very lows.   She has 1% low with 99% target range.     Iza Cortez presents today for follow up visit to discuss diabetes management.   She is well controlled with no complaints at present. She has mentioned that she has seen her glucose be lower overnight here and there.     We discussed her medication regimen. We will start decreasing her long acting insulin with the intentions of d/cing it altogether if her glucose  "remains stable.       Current diet: diabetic diet   Activity Level: light     Lab Results   Component Value Date    HGBA1C 5.5 03/11/2025    HGBA1C 7.8 (A) 12/10/2024    HGBA1C 12.4 (H) 09/24/2024       STANDARDS OF CARE  Diabetes Management Status    Statin: Not taking  ACE/ARB: Taking    Screening or Prevention Patient's value Goal Complete/Controlled?   HgA1C Testing and Control   Lab Results   Component Value Date    HGBA1C 5.5 03/11/2025      Annually/Less than 8% Yes   Lipid profile : 09/24/2024 Annually Yes   LDL control Lab Results   Component Value Date    LDLCALC 76 09/24/2024    Annually/Less than 100 mg/dl  Yes   Nephropathy screening No results found for: "LABMICR"  Lab Results   Component Value Date    PROTEINUA Negative 09/24/2024     No results found for: "UTPCR"   Annually Yes   Blood pressure BP Readings from Last 1 Encounters:   03/11/25 115/76    Less than 140/90 Yes   Dilated retinal exam : 10/30/2024 Annually Yes   Foot exam   : 12/10/2024 Annually Yes          Labs reviewed and are noted below.    Lab Results   Component Value Date    WBC 7.11 09/24/2024    HGB 11.0 (L) 09/24/2024    HCT 41 09/24/2024     09/24/2024    CHOL 154 09/24/2024    TRIG 149 09/24/2024    HDL 48 09/24/2024    LDLCALC 76 09/24/2024    ALT 46 09/24/2024    AST 33 09/24/2024     (L) 09/24/2024    K 4.4 09/24/2024    CL 99 09/24/2024    ANIONGAP 9 09/24/2024    CREATININE 1.27 (H) 09/24/2024    ESTGFRAFRICA 113 08/04/2021    BUN 18 09/24/2024    CO2 27 09/24/2024    TSH 1.450 09/24/2024     (H) 10/07/2024     Lab Results   Component Value Date    FREET4 1.11 09/24/2024    TSH 1.450 09/24/2024    CALCIUM 9.4 09/24/2024     No results found for: "CPEPTIDE"  No results found for: "GLUTAMICACID"  Glucose   Date Value Ref Range Status   10/07/2024 142 (H) 74 - 106 mg/dL Final     Anion Gap   Date Value Ref Range Status   09/24/2024 9 7 - 16 mmol/L Final     eGFR    Date Value Ref Range " Status   08/04/2021 113 >=60 mL/min/1.73m² Final       The following portions of the patient's history were reviewed and updated as appropriate: allergies, current medications, past family history, past medical history, past social history, past surgical history, and problem list.    Review of patient's allergies indicates:  No Known Allergies  Social History[1]  Past Medical History:   Diagnosis Date    Amenorrhea 09/27/2011    Amenorrhea, secondary 09/27/2011    Anemia 08/2006    mild; chronic 10/22/2012-Hgb L 10.4 g/dL 11.0 to 16.0 Hct L 32.5% 09/22/2015- No change in [facility]    Anemia 08/2006    mild; chronic 10/22/2012 - Hgb L 10.4 g/dl; 11.0 to 16.0;  Hct 32.5%;  9/22/2015 - no change    BV (bacterial vaginosis) 09/27/2011    Constipation 09/23/2015    chronic condition    Constipation, unspecified 09/23/2015    This is a chronic condition    Diabetes     Diabetes mellitus type II, non insulin dependent     history of     DUB (dysfunctional uterine bleeding) 03/2006    Dysfunctional uterine bleeding 03/2006    HTN (hypertension) 08/11/2011    Hypertension 08/11/2011    Leiomyoma, intramural 09/05/2006    cf the report 4 cm fibroid    Leiomyoma, intramural 09/05/2006    cf the report  4 cm fibroid    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  Cardiovascular: History of   GI: Denies nausea,vomiting,constipation,or diarrhea.  : Denies dysuria.  SKIN: Denies rashes and lesions.  Neuro: No neuropathy.  PSYCH: No tobacco use.  ENDO: See HPI.        Objective:      Vitals:    03/11/25 1001   BP: 115/76   Pulse: 97   Resp: 17     RESPIRATORY: No respiratory distress  NEUROLOGIC: Cranial nerves II-XII grossly intact.   PSYCHIATRIC: Alert & oriented x3.  "Normal mood and affect.  FOOT EXAMINATION: pulses present with no edema noted   Assessment:       1. Diabetes mellitus type II, non insulin dependent    2. Hypertension, unspecified type        Plan:   Diabetes mellitus type II, non insulin dependent  -     POCT Glucose, Hand-Held Device  -     Hemoglobin A1C, POCT       empagliflozin (JARDIANCE) 10 mg tablet; Take 1 tablet (10 mg total) by mouth once daily.  Dispense: 30 tablet; Refill: 2  -     semaglutide (OZEMPIC) 1 mg/dose (4 mg/3 mL); Inject 1 mg into the skin every 7 days.  Dispense: 3 mL; Refill: 11  -     SITagliptan-metformin (JANUMET XR) 50-1,000 mg TM24; Take 1 tablet by mouth 2 (two) times a day.  Dispense: 90 tablet; Refill: 3  - we will decrease toujeo by 2 units- monitor glucose readings and continue to decrease if able     Hypertension, unspecified type  - noted; stable during OV         - Follow up:  6 months       Portions of this note may have been created with voice recognition software. Occasional "wrong-word" or "sound-a-like" substitutions may have occurred due to the inherent limitations of voice recognition software. Please, read the note carefully and recognize, using context, where substitutions have occurred.         Diamond MODI/IDRIS  [facility]          [1]   Social History  Socioeconomic History    Marital status:    Tobacco Use    Smoking status: Never    Smokeless tobacco: Never   Substance and Sexual Activity    Alcohol use: Never    Drug use: Never    Sexual activity: Yes     Partners: Male     "

## 2025-03-11 NOTE — PATIENT INSTRUCTIONS
-- Medications adjusted for today's visit include:    Continue your medications as prescribed     Start backing off your long acting insulin   Decrease to 10 units tonight then continue to decrease by 2 units every other day with the goal fasting glucose in the AM to remain .     -- Limit carbs to no more than 30-45 grams with each meal. Never eat carbs by themselves, always add protein. Make snacks low carb or non-carb only.    -- Continue checking blood sugar 3 times daily: Fasting blood sugar and vary your next 2 readings: Before lunch, before supper, 2 hours after any meal, or bedtime.   -Blood sugar goals should be a fasting blood sugar between , and no blood sugars throughout the day over 180 is good, less than 160 better, less than 140 perfect.    --Carry glucose tablets/soft peppermints/regular juice or Coke product with you at all times to treat a low blood sugar episode (less than 70). If your blood sugar is between 50-70, Chew 4 tablets or drink 1/2 cup of juice or regular Coke product. If your blood sugar is below 50, double the treatment. Re-check blood sugar in 15 minutes. If still low, repeat this. Always call the clinic to give an update for any low blood sugar episodes.    --Exercise as tolerated: Goal 30 minutes/day, 5 days/week. Start slowly and increase as tolerated.    --Follow-up for your next visit in 6 months     --Please either drop off, fax, or MyChart your readings to me as needed.

## 2025-07-01 RX ORDER — EMPAGLIFLOZIN 10 MG/1
10 TABLET, FILM COATED ORAL
Qty: 30 TABLET | Refills: 0 | Status: SHIPPED | OUTPATIENT
Start: 2025-07-01

## 2025-09-02 ENCOUNTER — OFFICE VISIT (OUTPATIENT)
Dept: DIABETES SERVICES | Facility: CLINIC | Age: 65
End: 2025-09-02
Payer: MEDICARE

## 2025-09-02 VITALS
WEIGHT: 199 LBS | HEIGHT: 65 IN | HEART RATE: 87 BPM | SYSTOLIC BLOOD PRESSURE: 138 MMHG | DIASTOLIC BLOOD PRESSURE: 88 MMHG | OXYGEN SATURATION: 98 % | RESPIRATION RATE: 18 BRPM | BODY MASS INDEX: 33.15 KG/M2

## 2025-09-02 DIAGNOSIS — I10 HYPERTENSION, UNSPECIFIED TYPE: ICD-10-CM

## 2025-09-02 DIAGNOSIS — E11.9 DIABETES MELLITUS TYPE II, NON INSULIN DEPENDENT: Primary | ICD-10-CM

## 2025-09-02 DIAGNOSIS — E66.9 OBESITY WITH BODY MASS INDEX OF 30.0-39.9: ICD-10-CM

## 2025-09-02 LAB
CREAT UR-MCNC: 35 MG/DL (ref 15–325)
GLUCOSE SERPL-MCNC: 98 MG/DL (ref 70–110)
HBA1C MFR BLD: 5.5 % (ref 4.5–6.6)
MICROALBUMIN UR-MCNC: <0.5 MG/DL
MICROALBUMIN/CREAT RATIO PNL UR: NORMAL

## 2025-09-02 PROCEDURE — 3008F BODY MASS INDEX DOCD: CPT | Mod: CPTII,,, | Performed by: NURSE PRACTITIONER

## 2025-09-02 PROCEDURE — 99999PBSHW POCT HEMOGLOBIN A1C: Mod: PBBFAC,,,

## 2025-09-02 PROCEDURE — 1160F RVW MEDS BY RX/DR IN RCRD: CPT | Mod: CPTII,,, | Performed by: NURSE PRACTITIONER

## 2025-09-02 PROCEDURE — 3288F FALL RISK ASSESSMENT DOCD: CPT | Mod: CPTII,,, | Performed by: NURSE PRACTITIONER

## 2025-09-02 PROCEDURE — 99999 PR PBB SHADOW E&M-EST. PATIENT-LVL IV: CPT | Mod: PBBFAC,,, | Performed by: NURSE PRACTITIONER

## 2025-09-02 PROCEDURE — 82043 UR ALBUMIN QUANTITATIVE: CPT | Mod: ,,, | Performed by: CLINICAL MEDICAL LABORATORY

## 2025-09-02 PROCEDURE — 3075F SYST BP GE 130 - 139MM HG: CPT | Mod: CPTII,,, | Performed by: NURSE PRACTITIONER

## 2025-09-02 PROCEDURE — 99999PBSHW POCT GLUCOSE, HAND-HELD DEVICE: Mod: PBBFAC,,,

## 2025-09-02 PROCEDURE — 99214 OFFICE O/P EST MOD 30 MIN: CPT | Mod: PBBFAC | Performed by: NURSE PRACTITIONER

## 2025-09-02 PROCEDURE — 3044F HG A1C LEVEL LT 7.0%: CPT | Mod: CPTII,,, | Performed by: NURSE PRACTITIONER

## 2025-09-02 PROCEDURE — 82962 GLUCOSE BLOOD TEST: CPT | Mod: PBBFAC | Performed by: NURSE PRACTITIONER

## 2025-09-02 PROCEDURE — 83036 HEMOGLOBIN GLYCOSYLATED A1C: CPT | Mod: PBBFAC | Performed by: NURSE PRACTITIONER

## 2025-09-02 PROCEDURE — 1101F PT FALLS ASSESS-DOCD LE1/YR: CPT | Mod: CPTII,,, | Performed by: NURSE PRACTITIONER

## 2025-09-02 PROCEDURE — 3079F DIAST BP 80-89 MM HG: CPT | Mod: CPTII,,, | Performed by: NURSE PRACTITIONER

## 2025-09-02 PROCEDURE — 82570 ASSAY OF URINE CREATININE: CPT | Mod: ,,, | Performed by: CLINICAL MEDICAL LABORATORY

## 2025-09-02 PROCEDURE — 99214 OFFICE O/P EST MOD 30 MIN: CPT | Mod: S$PBB,,, | Performed by: NURSE PRACTITIONER

## 2025-09-02 PROCEDURE — 1126F AMNT PAIN NOTED NONE PRSNT: CPT | Mod: CPTII,,, | Performed by: NURSE PRACTITIONER

## 2025-09-02 PROCEDURE — 1159F MED LIST DOCD IN RCRD: CPT | Mod: CPTII,,, | Performed by: NURSE PRACTITIONER

## 2025-09-02 RX ORDER — SEMAGLUTIDE 1.34 MG/ML
1 INJECTION, SOLUTION SUBCUTANEOUS
Qty: 9 ML | Refills: 3 | Status: SHIPPED | OUTPATIENT
Start: 2025-09-02 | End: 2026-09-02

## 2025-09-02 RX ORDER — LOSARTAN POTASSIUM AND HYDROCHLOROTHIAZIDE 12.5; 5 MG/1; MG/1
1 TABLET ORAL DAILY
Qty: 90 TABLET | Refills: 3 | Status: SHIPPED | OUTPATIENT
Start: 2025-09-02 | End: 2026-09-02